# Patient Record
Sex: FEMALE | Race: WHITE | Employment: OTHER | URBAN - METROPOLITAN AREA
[De-identification: names, ages, dates, MRNs, and addresses within clinical notes are randomized per-mention and may not be internally consistent; named-entity substitution may affect disease eponyms.]

---

## 2017-02-11 DIAGNOSIS — Z11.4 SCREENING FOR HIV (HUMAN IMMUNODEFICIENCY VIRUS): ICD-10-CM

## 2017-02-11 DIAGNOSIS — E03.9 ACQUIRED HYPOTHYROIDISM: ICD-10-CM

## 2017-02-11 LAB — TSH REFLEX: 2.95 UIU/ML (ref 0.27–4.2)

## 2017-02-13 LAB — HIV-1 AND HIV-2 ANTIBODIES: NORMAL

## 2017-02-21 ENCOUNTER — OFFICE VISIT (OUTPATIENT)
Dept: FAMILY MEDICINE CLINIC | Age: 47
End: 2017-02-21

## 2017-02-21 VITALS
TEMPERATURE: 98 F | DIASTOLIC BLOOD PRESSURE: 70 MMHG | BODY MASS INDEX: 26.78 KG/M2 | SYSTOLIC BLOOD PRESSURE: 100 MMHG | WEIGHT: 156 LBS

## 2017-02-21 DIAGNOSIS — Z13.220 SCREENING CHOLESTEROL LEVEL: ICD-10-CM

## 2017-02-21 DIAGNOSIS — Z13.1 SCREENING FOR DIABETES MELLITUS: ICD-10-CM

## 2017-02-21 DIAGNOSIS — E03.9 ACQUIRED HYPOTHYROIDISM: Primary | ICD-10-CM

## 2017-02-21 PROBLEM — Z97.0 PRESENCE OF ARTIFICIAL LEFT EYE: Status: ACTIVE | Noted: 2017-02-21

## 2017-02-21 PROCEDURE — 99213 OFFICE O/P EST LOW 20 MIN: CPT | Performed by: FAMILY MEDICINE

## 2017-02-21 RX ORDER — LEVOTHYROXINE SODIUM 0.07 MG/1
75 TABLET ORAL DAILY
Qty: 30 TABLET | Refills: 12 | Status: SHIPPED | OUTPATIENT
Start: 2017-02-21 | End: 2017-10-20 | Stop reason: ALTCHOICE

## 2017-02-27 ASSESSMENT — ENCOUNTER SYMPTOMS
SHORTNESS OF BREATH: 0
ABDOMINAL PAIN: 0

## 2017-05-10 ENCOUNTER — HOSPITAL ENCOUNTER (OUTPATIENT)
Dept: CT IMAGING | Age: 47
Discharge: HOME | End: 2017-05-10
Attending: FAMILY MEDICINE | Admitting: FAMILY MEDICINE

## 2017-05-10 DIAGNOSIS — Z93.1 GASTROSTOMY TUBE IN PLACE (HCC): ICD-10-CM

## 2017-05-10 DIAGNOSIS — K59.00 CONSTIPATION, UNSPECIFIED CONSTIPATION TYPE: ICD-10-CM

## 2017-05-31 ENCOUNTER — TELEPHONE (OUTPATIENT)
Dept: FAMILY MEDICINE CLINIC | Age: 47
End: 2017-05-31

## 2017-06-02 ENCOUNTER — OFFICE VISIT (OUTPATIENT)
Dept: FAMILY MEDICINE CLINIC | Age: 47
End: 2017-06-02

## 2017-06-02 VITALS — SYSTOLIC BLOOD PRESSURE: 108 MMHG | HEIGHT: 64 IN | DIASTOLIC BLOOD PRESSURE: 66 MMHG

## 2017-06-02 DIAGNOSIS — Z93.3 S/P COLOSTOMY (HCC): ICD-10-CM

## 2017-06-02 DIAGNOSIS — G81.91 RIGHT HEMIPARESIS (HCC): ICD-10-CM

## 2017-06-02 DIAGNOSIS — Z97.0 EYE GLOBE PROSTHESIS: Primary | ICD-10-CM

## 2017-06-02 DIAGNOSIS — T07.XXXA MULTIPLE TRAUMA: ICD-10-CM

## 2017-06-02 DIAGNOSIS — E03.9 ACQUIRED HYPOTHYROIDISM: ICD-10-CM

## 2017-06-02 PROCEDURE — 99214 OFFICE O/P EST MOD 30 MIN: CPT | Performed by: INTERNAL MEDICINE

## 2017-06-02 RX ORDER — TRAMADOL HYDROCHLORIDE 50 MG/1
50 TABLET ORAL 2 TIMES DAILY
COMMUNITY
End: 2017-10-20 | Stop reason: ALTCHOICE

## 2017-06-02 RX ORDER — SENNA AND DOCUSATE SODIUM 50; 8.6 MG/1; MG/1
1 TABLET, FILM COATED ORAL DAILY
COMMUNITY

## 2017-06-02 RX ORDER — MOXIFLOXACIN 5 MG/ML
1 SOLUTION/ DROPS OPHTHALMIC 3 TIMES DAILY
COMMUNITY
End: 2017-10-20 | Stop reason: ALTCHOICE

## 2017-06-02 RX ORDER — AMANTADINE HYDROCHLORIDE 100 MG/1
200 TABLET ORAL DAILY
COMMUNITY
End: 2017-07-28 | Stop reason: ALTCHOICE

## 2017-06-02 RX ORDER — MODAFINIL 100 MG/1
100 TABLET ORAL DAILY
COMMUNITY
End: 2017-07-28 | Stop reason: ALTCHOICE

## 2017-06-02 ASSESSMENT — ENCOUNTER SYMPTOMS
DIARRHEA: 0
WHEEZING: 0
APNEA: 0
BLOOD IN STOOL: 0
CONSTIPATION: 0
NAUSEA: 0
COUGH: 0
ABDOMINAL PAIN: 0
SORE THROAT: 0
SHORTNESS OF BREATH: 0

## 2017-06-16 ENCOUNTER — INITIAL CONSULT (OUTPATIENT)
Dept: SURGERY | Age: 47
End: 2017-06-16

## 2017-06-16 VITALS — DIASTOLIC BLOOD PRESSURE: 70 MMHG | SYSTOLIC BLOOD PRESSURE: 120 MMHG

## 2017-06-16 DIAGNOSIS — Z93.3 COLOSTOMY IN PLACE (HCC): ICD-10-CM

## 2017-06-16 DIAGNOSIS — G81.91 RIGHT HEMIPARESIS (HCC): ICD-10-CM

## 2017-06-16 DIAGNOSIS — T07.XXXA MULTIPLE TRAUMA: Primary | ICD-10-CM

## 2017-06-16 PROCEDURE — 99204 OFFICE O/P NEW MOD 45 MIN: CPT | Performed by: SURGERY

## 2017-06-23 ENCOUNTER — TELEPHONE (OUTPATIENT)
Dept: SURGERY | Age: 47
End: 2017-06-23

## 2017-07-14 ENCOUNTER — TELEPHONE (OUTPATIENT)
Dept: SURGERY | Age: 47
End: 2017-07-14

## 2017-07-28 ENCOUNTER — OFFICE VISIT (OUTPATIENT)
Dept: FAMILY MEDICINE CLINIC | Age: 47
End: 2017-07-28

## 2017-07-28 VITALS
WEIGHT: 138 LBS | DIASTOLIC BLOOD PRESSURE: 78 MMHG | BODY MASS INDEX: 23.56 KG/M2 | SYSTOLIC BLOOD PRESSURE: 126 MMHG | HEIGHT: 64 IN | TEMPERATURE: 98.2 F

## 2017-07-28 DIAGNOSIS — G89.29 CHRONIC RIGHT SHOULDER PAIN: Primary | ICD-10-CM

## 2017-07-28 DIAGNOSIS — Z97.0 PRESENCE OF ARTIFICIAL LEFT EYE: ICD-10-CM

## 2017-07-28 DIAGNOSIS — T07.XXXA MULTIPLE TRAUMA: ICD-10-CM

## 2017-07-28 DIAGNOSIS — M25.511 CHRONIC RIGHT SHOULDER PAIN: Primary | ICD-10-CM

## 2017-07-28 DIAGNOSIS — E78.5 DYSLIPIDEMIA: ICD-10-CM

## 2017-07-28 DIAGNOSIS — E03.9 ACQUIRED HYPOTHYROIDISM: ICD-10-CM

## 2017-07-28 PROCEDURE — 99242 OFF/OP CONSLTJ NEW/EST SF 20: CPT | Performed by: INTERNAL MEDICINE

## 2017-07-28 ASSESSMENT — ENCOUNTER SYMPTOMS
BLOOD IN STOOL: 0
DIARRHEA: 0
NAUSEA: 0
VOMITING: 0
SINUS PRESSURE: 0
WHEEZING: 0
COUGH: 0
ABDOMINAL PAIN: 0
SHORTNESS OF BREATH: 0
SORE THROAT: 0
APNEA: 0
CONSTIPATION: 0
RHINORRHEA: 0

## 2017-08-17 PROBLEM — R56.9 SEIZURE (HCC): Status: ACTIVE | Noted: 2017-03-07

## 2017-08-17 PROBLEM — S06.9X0S LATE EFFECT OF BRAIN INJURY (HCC): Status: ACTIVE | Noted: 2017-03-17

## 2017-08-17 PROBLEM — S32.009A FRACTURE OF LUMBAR VERTEBRA (HCC): Status: ACTIVE | Noted: 2017-03-13

## 2017-08-17 PROBLEM — S92.009A CLOSED FRACTURE OF CALCANEUS: Status: ACTIVE | Noted: 2017-03-21

## 2017-08-17 PROBLEM — S06.6X9A SUBARACHNOID HEMORRHAGE FOLLOWING INJURY WITHOUT OPEN INTRACRANIAL WOUND AND WITH LOSS OF CONSCIOUSNESS (HCC): Status: ACTIVE | Noted: 2017-03-13

## 2017-08-17 PROBLEM — S06.9XAS LATE EFFECT OF BRAIN INJURY: Status: ACTIVE | Noted: 2017-03-17

## 2017-08-17 PROBLEM — S36.039A LACERATION OF SPLEEN: Status: ACTIVE | Noted: 2017-03-13

## 2017-08-17 PROBLEM — S27.809A DIAPHRAGM INJURY: Status: ACTIVE | Noted: 2017-03-13

## 2017-08-18 ENCOUNTER — OFFICE VISIT (OUTPATIENT)
Dept: SURGERY | Age: 47
End: 2017-08-18

## 2017-08-18 VITALS
SYSTOLIC BLOOD PRESSURE: 111 MMHG | WEIGHT: 137 LBS | DIASTOLIC BLOOD PRESSURE: 73 MMHG | HEART RATE: 102 BPM | BODY MASS INDEX: 23.52 KG/M2

## 2017-08-18 DIAGNOSIS — G81.91 RIGHT HEMIPARESIS (HCC): ICD-10-CM

## 2017-08-18 DIAGNOSIS — Z93.3 COLOSTOMY IN PLACE (HCC): Primary | ICD-10-CM

## 2017-08-18 DIAGNOSIS — T07.XXXA MULTIPLE TRAUMA: ICD-10-CM

## 2017-08-18 PROCEDURE — 99213 OFFICE O/P EST LOW 20 MIN: CPT | Performed by: SURGERY

## 2017-08-18 RX ORDER — POLYETHYLENE GLYCOL 3350 17 G/17G
17 POWDER, FOR SOLUTION ORAL DAILY
Qty: 255 G | Refills: 0 | Status: SHIPPED | OUTPATIENT
Start: 2017-08-18 | End: 2017-09-17

## 2017-08-18 ASSESSMENT — ENCOUNTER SYMPTOMS
GASTROINTESTINAL NEGATIVE: 1
EYE ITCHING: 0
BACK PAIN: 0
EYE REDNESS: 0
ALLERGIC/IMMUNOLOGIC NEGATIVE: 1
PHOTOPHOBIA: 0
RESPIRATORY NEGATIVE: 1
EYE PAIN: 0
EYE DISCHARGE: 0

## 2017-08-23 ENCOUNTER — TELEPHONE (OUTPATIENT)
Dept: SURGERY | Age: 47
End: 2017-08-23

## 2017-08-23 NOTE — TELEPHONE ENCOUNTER
Returned the call to the patient' s  bill who states the patient is scheduled to have the Barium Enema a performed on 8/28/17, he has been advised patient should not  perform the bowel prep the day before the procedure, the bowel prep instructions were provided for sx only should the patient proceed to colonoscopy reversal,  He understood the information  has agreed to this.

## 2017-08-23 NOTE — TELEPHONE ENCOUNTER
Pt is having barium enema and they want to know if it is going to be done thru colostomy bag or rectum please call Pamella at [facility] back

## 2017-08-28 ENCOUNTER — HOSPITAL ENCOUNTER (OUTPATIENT)
Dept: GENERAL RADIOLOGY | Age: 47
Discharge: HOME | End: 2017-08-28
Attending: SURGERY | Admitting: SURGERY

## 2017-08-28 DIAGNOSIS — Z93.3 COLOSTOMY STATUS (HCC): ICD-10-CM

## 2017-08-28 DIAGNOSIS — Z93.3 COLOSTOMY IN PLACE (HCC): ICD-10-CM

## 2017-09-22 ENCOUNTER — TELEPHONE (OUTPATIENT)
Dept: SURGERY | Age: 47
End: 2017-09-22

## 2017-10-20 ENCOUNTER — OFFICE VISIT (OUTPATIENT)
Dept: SURGERY | Age: 47
End: 2017-10-20

## 2017-10-20 ENCOUNTER — TELEPHONE (OUTPATIENT)
Dept: SURGERY | Age: 47
End: 2017-10-20

## 2017-10-20 VITALS
DIASTOLIC BLOOD PRESSURE: 74 MMHG | OXYGEN SATURATION: 98 % | HEIGHT: 64 IN | SYSTOLIC BLOOD PRESSURE: 126 MMHG | BODY MASS INDEX: 22.77 KG/M2 | TEMPERATURE: 97.6 F | RESPIRATION RATE: 16 BRPM | WEIGHT: 133.4 LBS | HEART RATE: 91 BPM

## 2017-10-20 DIAGNOSIS — S06.6X9D: ICD-10-CM

## 2017-10-20 DIAGNOSIS — Z93.3 COLOSTOMY IN PLACE (HCC): ICD-10-CM

## 2017-10-20 DIAGNOSIS — T07.XXXA MULTIPLE TRAUMA: Primary | ICD-10-CM

## 2017-10-20 PROCEDURE — 99213 OFFICE O/P EST LOW 20 MIN: CPT | Performed by: SURGERY

## 2017-10-20 RX ORDER — POLYETHYLENE GLYCOL 3350 17 G/17G
17 POWDER, FOR SOLUTION ORAL DAILY
Qty: 255 G | Refills: 0 | Status: SHIPPED | OUTPATIENT
Start: 2017-10-20 | End: 2017-11-19

## 2017-10-20 ASSESSMENT — ENCOUNTER SYMPTOMS
ALLERGIC/IMMUNOLOGIC NEGATIVE: 1
EYE REDNESS: 0
COLOR CHANGE: 0
EYE ITCHING: 0
BACK PAIN: 0
RESPIRATORY NEGATIVE: 1
EYE PAIN: 0
PHOTOPHOBIA: 0
EYE DISCHARGE: 0

## 2017-10-20 NOTE — TELEPHONE ENCOUNTER
Returned the call tot he patient's  advising the  the patient is currently on the OR schedule for 11/8/2017, the patient's   was ok with the information today.

## 2017-10-20 NOTE — LETTER
CONSENT TO OPERATION  AND/OR OTHER PROCEDURE(S)          PATIENT : Thony Dan OF BIRTH:  1970      DATE : 10/20/17          1. I request and consent that Dr. Miquel Marcano,  and/or his associates or assistants perform an operation and/or procedures on the above patient at  [facility], to treat the condition(s) which appear indicated by the diagnostic studies already performed. I have been told that in general terms the nature, purpose and reasonable expectations of the operation and/or procedure(s) are:     Colostomy Reversal      2. It has been explained to me by the informing physician that during the course of the operation and/or procedure(s) unforeseen conditions may be revealed that necessitate an extension of the original operation and/or procedure(s) or different operation and/or procedures than those set forth in Paragraph 1. I therefore authorize and request that my physician and/or his associates or assistants perform such operations and/or procedures as are necessary and desirable in the exercise of professional judgment. The authority granted under this Paragraph 2 shall extend to all conditions that require treatment and are known to my physician at the time the operation is commenced. 3. I have been made aware by the informing physician of certain risks and consequences that are associated with the operation and/or procedure(s) described in Paragraph 1, the reasonable alternative methods or treatment, the possible consequences, the possibility that the operation and/or procedure(s) may be unsuccessful and the possibility of complications. I understand the reasonably known risks to be:      ? Bleeding  ? Infection  ? Poor Healing  ? Possible Damage to Nerve, Vessel, Tendon/Muscle or Bone  ? Need for further Treatment/Surgery  ? Stiffness  ? Pain  ? Residual or Recurrent Symptoms  ?  Anesthetic and/or Medical Risks

## 2017-10-30 ENCOUNTER — OFFICE VISIT (OUTPATIENT)
Dept: FAMILY MEDICINE CLINIC | Age: 47
End: 2017-10-30

## 2017-10-30 VITALS
SYSTOLIC BLOOD PRESSURE: 110 MMHG | DIASTOLIC BLOOD PRESSURE: 60 MMHG | HEIGHT: 64 IN | HEART RATE: 96 BPM | TEMPERATURE: 98.1 F | BODY MASS INDEX: 24.55 KG/M2 | WEIGHT: 143.8 LBS

## 2017-10-30 DIAGNOSIS — G81.91 RIGHT HEMIPARESIS (HCC): ICD-10-CM

## 2017-10-30 DIAGNOSIS — E03.9 ACQUIRED HYPOTHYROIDISM: ICD-10-CM

## 2017-10-30 DIAGNOSIS — Z23 NEED FOR INFLUENZA VACCINATION: ICD-10-CM

## 2017-10-30 DIAGNOSIS — Z93.3 COLOSTOMY IN PLACE (HCC): ICD-10-CM

## 2017-10-30 DIAGNOSIS — Z01.818 PRE-OP EXAM: Primary | ICD-10-CM

## 2017-10-30 DIAGNOSIS — Z97.0 PRESENCE OF ARTIFICIAL LEFT EYE: ICD-10-CM

## 2017-10-30 DIAGNOSIS — S06.9X0S LATE EFFECT OF BRAIN INJURY (HCC): ICD-10-CM

## 2017-10-30 DIAGNOSIS — T07.XXXA MULTIPLE TRAUMA: ICD-10-CM

## 2017-10-30 PROCEDURE — 90686 IIV4 VACC NO PRSV 0.5 ML IM: CPT | Performed by: FAMILY MEDICINE

## 2017-10-30 PROCEDURE — 90471 IMMUNIZATION ADMIN: CPT | Performed by: FAMILY MEDICINE

## 2017-10-30 PROCEDURE — 99244 OFF/OP CNSLTJ NEW/EST MOD 40: CPT | Performed by: FAMILY MEDICINE

## 2017-10-30 RX ORDER — LEVOTHYROXINE SODIUM 0.07 MG/1
75 TABLET ORAL DAILY
Qty: 30 TABLET | Refills: 12 | Status: SHIPPED | OUTPATIENT
Start: 2017-10-30 | End: 2017-12-19 | Stop reason: SDUPTHER

## 2017-10-30 ASSESSMENT — ENCOUNTER SYMPTOMS
DIARRHEA: 0
SORE THROAT: 0
ABDOMINAL PAIN: 0
SHORTNESS OF BREATH: 0
EYE ITCHING: 0
EYE DISCHARGE: 0
COUGH: 0
VOMITING: 0
TROUBLE SWALLOWING: 0
BLOOD IN STOOL: 0
EYE REDNESS: 0

## 2017-10-30 NOTE — PROGRESS NOTES
Wt Readings from Last 3 Encounters:   10/30/17 143 lb 12.8 oz (65.2 kg)   10/20/17 133 lb 6.4 oz (60.5 kg)   08/18/17 137 lb (62.1 kg)     Body mass index is 24.68 kg/m². Immunization History   Administered Date(s) Administered    Tdap (Boostrix, Adacel) 04/02/2009             ASSESSMENT  1. Pre-op exam     2. Colostomy in place [facility])     3. Multiple trauma     4. Late effect of brain injury ([facility] [address]     5. Right hemiparesis ([facility] [address]     6. Acquired hypothyroidism     7. Presence of artificial left eye         PLAN      Khadra Avalos is at a low risk for planned colostomy reversal.  She may proceed with abdominal surgery as planned. Flu vacc today      New meds this visit:    Orders Placed This Encounter   Medications    levothyroxine (LEVOTHROID) 75 MCG tablet     Sig: Take 1 tablet by mouth daily     Dispense:  30 tablet     Refill:  12       New orders placed this visit:  No orders of the defined types were placed in this encounter. continue with the following meds:    Outpatient Encounter Prescriptions as of 10/30/2017   Medication Sig Dispense Refill    levothyroxine (LEVOTHROID) 75 MCG tablet Take 1 tablet by mouth daily 30 tablet 12    sennosides-docusate sodium (SENOKOT-S) 8.6-50 MG tablet Take 1 tablet by mouth daily      polyethylene glycol (MIRALAX) powder Take 17 g by mouth daily 255 g 0     No facility-administered encounter medications on file as of 10/30/2017.           Arlette Whalen D.O.

## 2017-10-30 NOTE — PATIENT INSTRUCTIONS
Please call your pharmacy if you need any refills of your medication(s). Please call our office at [phone] if you don't hear from us about your test results. Please be sure to call our office if your illness/problem has been treated for but has not completely resolved. Bring an accurate list of your medications with you at every appointment to ensure that we have the correct information. Our office hours are: Monday - Friday 7 am- 5 pm; Saturdays vary on doctor working.     Phone lines turn on at 8 am.

## 2017-11-03 ENCOUNTER — PAT TELEPHONE (OUTPATIENT)
Dept: PREADMISSION TESTING | Age: 47
End: 2017-11-03

## 2017-11-03 NOTE — PRE-PROCEDURE INSTRUCTIONS
of surgery. All jewelry must be removed. If you have dentures, they will be removed before going to operating room. For your convenience, we will provide you with a container. If you wear contact lenses or glasses, they will be removed, please bring a case for them. If you have a living will and a durable power of  for healthcare, please bring in a copy. As part of our patient safety program to minimize surgical site infections, we ask you to do the following:    · Please notify your surgeon if you develop any illness between         now and the  day of your surgery. · This includes a cough, cold, fever, sore throat, nausea,         or vomiting, and diarrhea, etc.  ·  Please notify your surgeon if you experience dizziness, shortness         of breath or blurred vision between now and the time of your surgery. Do not shave your operative site 96 hours prior to surgery. For face and neck surgery, men may use an electric razor 48 hours   prior to surgery. You may shower the night before surgery or the morning of   your surgery with an antibacterial soap. You will need to bring a photo ID and insurance card    [facility] has an onsite pharmacy, would you like to utilize our pharmacy     If you will be staying overnight and use a C-pap machine, please bring   your C-pap to hospital     Our goal is to provide you with excellent care, therefore, visitors will be limited to two(2) in the room at a time so that we may focus on providing this care for you. Please contact pre-admission testing if you have any further questions. [facility] phone number:  [phone] [facility] fax number:  [phone]  Please note these are generalized instructions for all surgical cases, you may be provided with more specific instructions according to your surgery.

## 2017-11-06 ENCOUNTER — SURG/PROC ORDERS (OUTPATIENT)
Dept: ANESTHESIOLOGY | Age: 47
End: 2017-11-06

## 2017-11-06 RX ORDER — SODIUM CHLORIDE 0.9 % (FLUSH) 0.9 %
10 SYRINGE (ML) INJECTION PRN
Status: CANCELLED | OUTPATIENT
Start: 2017-11-06

## 2017-11-06 RX ORDER — SODIUM CHLORIDE 0.9 % (FLUSH) 0.9 %
10 SYRINGE (ML) INJECTION EVERY 12 HOURS SCHEDULED
Status: CANCELLED | OUTPATIENT
Start: 2017-11-06

## 2017-11-06 RX ORDER — SODIUM CHLORIDE 9 MG/ML
INJECTION, SOLUTION INTRAVENOUS CONTINUOUS
Status: CANCELLED | OUTPATIENT
Start: 2017-11-06

## 2017-11-10 PROBLEM — Z98.890 STATUS POST COLOSTOMY TAKEDOWN: Status: ACTIVE | Noted: 2017-11-10

## 2017-11-14 ENCOUNTER — TELEPHONE (OUTPATIENT)
Dept: SURGERY | Age: 47
End: 2017-11-14

## 2017-11-14 ENCOUNTER — CARE COORDINATION (OUTPATIENT)
Dept: CASE MANAGEMENT | Age: 47
End: 2017-11-14

## 2017-11-14 NOTE — TELEPHONE ENCOUNTER
Returned the call to the patients  Magda Meehan, was able to offer an apt for 11/28 at 4:00 pm, Bill agreed to this, the call ended.

## 2017-11-15 ENCOUNTER — CARE COORDINATION (OUTPATIENT)
Dept: CARE COORDINATION | Age: 47
End: 2017-11-15

## 2017-11-16 ENCOUNTER — CARE COORDINATION (OUTPATIENT)
Dept: MEDSURG UNIT | Age: 47
End: 2017-11-16

## 2017-11-16 NOTE — CARE COORDINATION
[facility] Transitions Follow Up Call    2017    Patient: Kendal Alpers  Patient : 1970   MRN: <[id number]>  Reason for Admission: There are no discharge diagnoses documented for the most recent discharge. Discharge Date: 17 RARS: Risk Score: 18.5       Spoke with:  Bill    Non-face-to-face services provided:  Obtained and reviewed discharge summary and/or continuity of care documents      Care Transitions Subsequent and Final Call    Subsequent and Final Calls  Care Transitions Interventions  Other Interventions:        Spoke with Pt's  bill for f/u transitions call. Pt's pain is @3-4/10. Taking Tylenol prn for pain. Denies any s/s of infection, fever. Reviewed upcoming appt. No questions or concerns at this time. Agreed to f/u transitions calls.     Indio Avila RN BSN   Care Transitions Coordinator  [phone]       Follow Up  Future Appointments  Date Time Provider Alexander Jose   2017 4:00 PM Don Andrade MD [facility]   2017 10:30 AM DO Armand Estevez [facility]       Indio Avila RN

## 2017-11-22 ENCOUNTER — CARE COORDINATION (OUTPATIENT)
Dept: MEDSURG UNIT | Age: 47
End: 2017-11-22

## 2017-11-22 NOTE — CARE COORDINATION
[facility] Transitions Follow Up Call    2017    Patient: Isidro Organ  Patient : 1970   MRN: <[id number]>  Reason for Admission: There are no discharge diagnoses documented for the most recent discharge. Discharge Date: 17 RARS: Risk Score: 18.5       Spoke with:  Bill    Non-face-to-face services provided:  Obtained and reviewed discharge summary and/or continuity of care documents      Care Transitions Subsequent and Final Call    Subsequent and Final Calls  Do you have any ongoing symptoms?:  No  Have your medications changed?:  No  Do you have any questions related to your medications?:  No  Do you currently have any active services?:  No  Do you have any needs or concerns that I can assist you with?:  No  Identified Barriers:  None  Care Transitions Interventions  Other Interventions:          Spoke with Pt's  [facility] for f/u transitions. Stated Pt is doing fine so far. Denies pain, fever. Pt tolerating diet well. Reviewed upcoming appts. For post Op and PCP.  stated PCP knew Pt was having procedure and did not need to see Pt sooner than  appt previously scheduled. No questions or concerns at this time. Will continue to follow for transitions.     Saloni Ribeiro, MICHELE BSN   Care Transitions Coordinator  [phone]     Follow Up  Future Appointments  Date Time Provider Alexander Jose   2017 4:00 PM Karan Milner MD [facility]   2017 10:30 AM DO Armand Catalan [facility]       Saloni Ribeiro RN

## 2017-11-27 ENCOUNTER — CARE COORDINATION (OUTPATIENT)
Dept: CASE MANAGEMENT | Age: 47
End: 2017-11-27

## 2017-11-28 ENCOUNTER — OFFICE VISIT (OUTPATIENT)
Dept: SURGERY | Age: 47
End: 2017-11-28

## 2017-11-28 VITALS
WEIGHT: 145 LBS | DIASTOLIC BLOOD PRESSURE: 75 MMHG | BODY MASS INDEX: 24.75 KG/M2 | HEART RATE: 102 BPM | HEIGHT: 64 IN | SYSTOLIC BLOOD PRESSURE: 116 MMHG

## 2017-11-28 DIAGNOSIS — S06.6X9D: ICD-10-CM

## 2017-11-28 DIAGNOSIS — S27.809D INJURY OF DIAPHRAGM, SUBSEQUENT ENCOUNTER: ICD-10-CM

## 2017-11-28 DIAGNOSIS — S32.009A CLOSED FRACTURE OF LUMBAR VERTEBRA, UNSPECIFIED FRACTURE MORPHOLOGY, UNSPECIFIED LUMBAR VERTEBRAL LEVEL, INITIAL ENCOUNTER (HCC): ICD-10-CM

## 2017-11-28 DIAGNOSIS — Z98.890 STATUS POST COLOSTOMY TAKEDOWN: Primary | ICD-10-CM

## 2017-11-28 PROCEDURE — 99024 POSTOP FOLLOW-UP VISIT: CPT | Performed by: SURGERY

## 2017-11-28 ASSESSMENT — ENCOUNTER SYMPTOMS
ALLERGIC/IMMUNOLOGIC NEGATIVE: 1
RESPIRATORY NEGATIVE: 1
EYE PAIN: 0
EYE REDNESS: 0
EYE DISCHARGE: 0
PHOTOPHOBIA: 0
COLOR CHANGE: 0
BACK PAIN: 0
EYE ITCHING: 0

## 2017-11-28 NOTE — PROGRESS NOTES
sodium (SENOKOT-S) 8.6-50 MG tablet, Take 1 tablet by mouth daily, Disp: , Rfl:     Cephalexin and Pcn [penicillins]    Vitals:    11/28/17 1549   BP: 116/75   Site: Left Arm   Position: Sitting   Cuff Size: Small Adult   Pulse: 102   Weight: 145 lb (65.8 kg)   Height: 5' 4\" (1.626 m)       Admission on 11/08/2017, Discharged on 11/13/2017   Component Date Value Ref Range Status    Sodium 11/08/2017 137  136 - 145 mmol/L Final    Potassium 11/08/2017 5.7* 3.5 - 5.1 mmol/L Final    Comment: Specimen hemolysis has exceeded the interference as defined by Roche. Value may be falsely increased. Suggest recollection if clinically  indicated.  Chloride 11/08/2017 101  99 - 110 mmol/L Final    CO2 11/08/2017 24  21 - 32 mmol/L Final    Anion Gap 11/08/2017 12  3 - 16 Final    Glucose 11/08/2017 106* 70 - 99 mg/dL Final    BUN 11/08/2017 10  7 - 20 mg/dL Final    CREATININE 11/08/2017 <0.5* 0.6 - 1.1 mg/dL Final    GFR Non- 11/08/2017 >60  >60 Final    Comment: >60 mL/min/1.73m2 EGFR, calc. for ages 25 and older using the  MDRD formula (not corrected for weight), is valid for stable  renal function.  GFR  11/08/2017 >60  >60 Final    Comment: Chronic Kidney Disease: less than 60 ml/min/1.73 sq.m. Kidney Failure: less than 15 ml/min/1.73 sq.m. Results valid for patients 18 years and older.       Calcium 11/08/2017 9.1  8.3 - 10.6 mg/dL Final    WBC 11/08/2017 10.8  4.0 - 11.0 K/uL Final    RBC 11/08/2017 4.21  4.00 - 5.20 M/uL Final    Hemoglobin 11/08/2017 12.9  12.0 - 16.0 g/dL Final    Hematocrit 11/08/2017 39.4  36.0 - 48.0 % Final    MCV 11/08/2017 93.7  80.0 - 100.0 fL Final    MCH 11/08/2017 30.7  26.0 - 34.0 pg Final    MCHC 11/08/2017 32.8  31.0 - 36.0 g/dL Final    RDW 11/08/2017 13.7  12.4 - 15.4 % Final    Platelets 41/34/8598 315  135 - 450 K/uL Final    MPV 11/08/2017 10.6* 5.0 - 10.5 fL Final    Ventricular Rate 11/14/2017 90  BPM Final    Atrial Rate 11/14/2017 90  BPM Final    P-R Interval 11/14/2017 144  ms Final    QRS Duration 11/14/2017 60  ms Final    Q-T Interval 11/14/2017 374  ms Final    QTc Calculation (Bazett) 11/14/2017 457  ms Final    P Axis 11/14/2017 52  degrees Final    R Axis 11/14/2017 6  degrees Final    T Axis 11/14/2017 28  degrees Final    Diagnosis 11/14/2017    Final                    Value:Normal sinus rhythm  Normal ECG  No previous ECGs available  Confirmed by CARA [facility]ACE [facility] MD, Dillon Yañez ([id number]) on 11/8/2017 10:39:05 AM      ABO/Rh 11/08/2017 CANCELED   Final    Comment: @11/08/17 09:08 by Kyle Boothe:  grossly hemolyzed move / moved to tube    Result canceled by the ancillary      Antibody Screen 11/08/2017 NEG   Final    hCG Qual 11/08/2017 Negative  Detects HCG level >10 MIU/mL Final    ABO/Rh 11/08/2017 O POS   Final    Comment: @11/08/17 09:12 by QZWBG:  grossly hemolyzed move / moved to tube      Gram Stain Result 11/13/2017    Final                    Value:No WBC's seen  2+ Gram positive cocci      Culture Surgical 11/13/2017 No growth 60-72 hours   Final    Anaerobic Culture 11/13/2017 No anaerobes isolated   Final    Sodium 11/09/2017 139  136 - 145 mmol/L Final    Potassium 11/09/2017 3.7  3.5 - 5.1 mmol/L Final    Chloride 11/09/2017 105  99 - 110 mmol/L Final    CO2 11/09/2017 23  21 - 32 mmol/L Final    Anion Gap 11/09/2017 11  3 - 16 Final    Glucose 11/09/2017 108* 70 - 99 mg/dL Final    BUN 11/09/2017 7  7 - 20 mg/dL Final    CREATININE 11/09/2017 <0.5* 0.6 - 1.1 mg/dL Final    GFR Non- 11/09/2017 >60  >60 Final    Comment: >60 mL/min/1.73m2 EGFR, calc. for ages 25 and older using the  MDRD formula (not corrected for weight), is valid for stable  renal function.  GFR  11/09/2017 >60  >60 Final    Comment: Chronic Kidney Disease: less than 60 ml/min/1.73 sq.m. Kidney Failure: less than 15 ml/min/1.73 sq.m. Results valid for patients 18 years and older.  Calcium 11/09/2017 7.8* 8.3 - 10.6 mg/dL Final    WBC 11/09/2017 14.5* 4.0 - 11.0 K/uL Final    RBC 11/09/2017 3.21* 4.00 - 5.20 M/uL Final    Hemoglobin 11/09/2017 10.0* 12.0 - 16.0 g/dL Final    Hematocrit 11/09/2017 29.9* 36.0 - 48.0 % Final    MCV 11/09/2017 93.0  80.0 - 100.0 fL Final    MCH 11/09/2017 31.1  26.0 - 34.0 pg Final    MCHC 11/09/2017 33.4  31.0 - 36.0 g/dL Final    RDW 11/09/2017 13.3  12.4 - 15.4 % Final    Platelets 69/42/1134 231  135 - 450 K/uL Final    MPV 11/09/2017 10.0  5.0 - 10.5 fL Final    WBC 11/10/2017 10.7  4.0 - 11.0 K/uL Final    RBC 11/10/2017 3.21* 4.00 - 5.20 M/uL Final    Hemoglobin 11/10/2017 9.9* 12.0 - 16.0 g/dL Final    Hematocrit 11/10/2017 30.3* 36.0 - 48.0 % Final    MCV 11/10/2017 94.4  80.0 - 100.0 fL Final    MCH 11/10/2017 30.9  26.0 - 34.0 pg Final    MCHC 11/10/2017 32.7  31.0 - 36.0 g/dL Final    RDW 11/10/2017 13.3  12.4 - 15.4 % Final    Platelets 22/07/7222 245  135 - 450 K/uL Final    MPV 11/10/2017 9.4  5.0 - 10.5 fL Final    Sodium 11/10/2017 137  136 - 145 mmol/L Final    Potassium 11/10/2017 3.4* 3.5 - 5.1 mmol/L Final    Chloride 11/10/2017 101  99 - 110 mmol/L Final    CO2 11/10/2017 22  21 - 32 mmol/L Final    Anion Gap 11/10/2017 14  3 - 16 Final    Glucose 11/10/2017 100* 70 - 99 mg/dL Final    BUN 11/10/2017 5* 7 - 20 mg/dL Final    CREATININE 11/10/2017 <0.5* 0.6 - 1.1 mg/dL Final    GFR Non- 11/10/2017 >60  >60 Final    Comment: >60 mL/min/1.73m2 EGFR, calc. for ages 25 and older using the  MDRD formula (not corrected for weight), is valid for stable  renal function.  GFR  11/10/2017 >60  >60 Final    Comment: Chronic Kidney Disease: less than 60 ml/min/1.73 sq.m. Kidney Failure: less than 15 ml/min/1.73 sq.m. Results valid for patients 18 years and older.       Calcium 11/10/2017 7.9* 8.3 - 10.6 mg/dL Final    WBC 11/13/2017 7.5  4.0 - 11.0 K/uL Final    RBC 11/13/2017 3.31* 4.00 - 5.20 M/uL Final    Hemoglobin 11/13/2017 10.2* 12.0 - 16.0 g/dL Final    Hematocrit 11/13/2017 30.6* 36.0 - 48.0 % Final    MCV 11/13/2017 92.6  80.0 - 100.0 fL Final    MCH 11/13/2017 30.8  26.0 - 34.0 pg Final    MCHC 11/13/2017 33.3  31.0 - 36.0 g/dL Final    RDW 11/13/2017 13.3  12.4 - 15.4 % Final    Platelets 79/60/2741 345  135 - 450 K/uL Final    MPV 11/13/2017 8.5  5.0 - 10.5 fL Final    Sodium 11/13/2017 140  136 - 145 mmol/L Final    Potassium 11/13/2017 3.6  3.5 - 5.1 mmol/L Final    Chloride 11/13/2017 104  99 - 110 mmol/L Final    CO2 11/13/2017 23  21 - 32 mmol/L Final    Anion Gap 11/13/2017 13  3 - 16 Final    Glucose 11/13/2017 112* 70 - 99 mg/dL Final    BUN 11/13/2017 5* 7 - 20 mg/dL Final    CREATININE 11/13/2017 <0.5* 0.6 - 1.1 mg/dL Final    GFR Non- 11/13/2017 >60  >60 Final    Comment: >60 mL/min/1.73m2 EGFR, calc. for ages 25 and older using the  MDRD formula (not corrected for weight), is valid for stable  renal function.  GFR  11/13/2017 >60  >60 Final    Comment: Chronic Kidney Disease: less than 60 ml/min/1.73 sq.m. Kidney Failure: less than 15 ml/min/1.73 sq.m. Results valid for patients 18 years and older.  Calcium 11/13/2017 8.5  8.3 - 10.6 mg/dL Final       No results found. Review of Systems   HENT: Negative. Eyes: Positive for visual disturbance. Negative for photophobia, pain, discharge, redness and itching. Respiratory: Negative. Cardiovascular: Negative. Endocrine: Negative. Genitourinary: Negative. Musculoskeletal: Positive for gait problem. Negative for arthralgias, back pain, joint swelling, myalgias, neck pain and neck stiffness. Skin: Positive for wound. Negative for color change, pallor and rash. Allergic/Immunologic: Negative. Neurological: Positive for weakness.  Negative for dizziness, tremors, seizures, syncope, facial asymmetry, speech difficulty, consciousness ([address]    Status post colostomy takedown - Primary      Other Visit Diagnoses    None. PLAN:  Discussed with the patient the surgical procedure, she understood the information discussed, all questions have been answered. Patient has been advised of the findings of cyst of the ovaries as well as the Left fallopian tube, as well as a small pocket of pus,  which we cultured. she understood the information. Removed the staples from the abdominal region today using a staple remover, with very minimal bleeding, patient tolerated this well. 2x2 gauze has been placed inside the opening with saline on the tip of the gauze, with overlapping 2x2 mateo and paper tape applied. Return in about 3 weeks (around 12/19/2017) for s/p ostomy reversal .    I Sydnee Mckeon MA am scribing for and in the presence of Sivakumar Adair MD on this date of 11/28/17     I Issac Bray MD personally performed the services described in this documentation as scribed by the Medical Assistant Sydnee Mckeon  in my presence and it is both accurate and complete.           Electronically signed by Sivakumar Adair MD on 11/28/2017 at 5:58 PM

## 2017-12-05 ENCOUNTER — TELEPHONE (OUTPATIENT)
Dept: FAMILY MEDICINE CLINIC | Age: 47
End: 2017-12-05

## 2017-12-05 DIAGNOSIS — Z00.00 ROUTINE GENERAL MEDICAL EXAMINATION AT A HEALTH CARE FACILITY: Primary | ICD-10-CM

## 2017-12-05 NOTE — TELEPHONE ENCOUNTER
Labs reordered. Called Bill to advise/fast 12 hours prior/have labs done at least 3 days prior to appt.

## 2017-12-12 DIAGNOSIS — Z00.00 ROUTINE GENERAL MEDICAL EXAMINATION AT A HEALTH CARE FACILITY: ICD-10-CM

## 2017-12-12 DIAGNOSIS — Z13.1 SCREENING FOR DIABETES MELLITUS: ICD-10-CM

## 2017-12-12 DIAGNOSIS — E03.9 ACQUIRED HYPOTHYROIDISM: ICD-10-CM

## 2017-12-12 DIAGNOSIS — Z13.220 SCREENING CHOLESTEROL LEVEL: ICD-10-CM

## 2017-12-12 LAB
ANION GAP SERPL CALCULATED.3IONS-SCNC: 14 MMOL/L (ref 3–16)
BUN BLDV-MCNC: 14 MG/DL (ref 7–20)
CALCIUM SERPL-MCNC: 9.4 MG/DL (ref 8.3–10.6)
CHLORIDE BLD-SCNC: 103 MMOL/L (ref 99–110)
CHOLESTEROL, TOTAL: 113 MG/DL (ref 0–199)
CO2: 25 MMOL/L (ref 21–32)
CREAT SERPL-MCNC: 0.5 MG/DL (ref 0.6–1.1)
GFR AFRICAN AMERICAN: >60
GFR NON-AFRICAN AMERICAN: >60
GLUCOSE BLD-MCNC: 94 MG/DL (ref 70–99)
HDLC SERPL-MCNC: 40 MG/DL (ref 40–60)
LDL CHOLESTEROL CALCULATED: 55 MG/DL
POTASSIUM SERPL-SCNC: 4.3 MMOL/L (ref 3.5–5.1)
SODIUM BLD-SCNC: 142 MMOL/L (ref 136–145)
TRIGL SERPL-MCNC: 88 MG/DL (ref 0–150)
TSH REFLEX: 3.69 UIU/ML (ref 0.27–4.2)
VLDLC SERPL CALC-MCNC: 18 MG/DL

## 2017-12-15 ENCOUNTER — OFFICE VISIT (OUTPATIENT)
Dept: SURGERY | Age: 47
End: 2017-12-15

## 2017-12-15 VITALS
BODY MASS INDEX: 24.75 KG/M2 | HEIGHT: 64 IN | HEART RATE: 87 BPM | WEIGHT: 145 LBS | DIASTOLIC BLOOD PRESSURE: 69 MMHG | SYSTOLIC BLOOD PRESSURE: 103 MMHG

## 2017-12-15 DIAGNOSIS — Z98.890 STATUS POST COLOSTOMY TAKEDOWN: Primary | ICD-10-CM

## 2017-12-15 DIAGNOSIS — S06.6X9A: ICD-10-CM

## 2017-12-15 DIAGNOSIS — S32.009A CLOSED FRACTURE OF LUMBAR VERTEBRA, UNSPECIFIED FRACTURE MORPHOLOGY, UNSPECIFIED LUMBAR VERTEBRAL LEVEL, INITIAL ENCOUNTER (HCC): ICD-10-CM

## 2017-12-15 DIAGNOSIS — S06.9X0S LATE EFFECT OF BRAIN INJURY (HCC): ICD-10-CM

## 2017-12-15 PROCEDURE — 99024 POSTOP FOLLOW-UP VISIT: CPT | Performed by: SURGERY

## 2017-12-15 NOTE — PATIENT INSTRUCTIONS
Patient is doing well doing, the incision apears to be healing properly. Patient has been advised to return to our office as needed.

## 2017-12-15 NOTE — PROGRESS NOTES
sennosides-docusate sodium (SENOKOT-S) 8.6-50 MG tablet, Take 1 tablet by mouth daily, Disp: , Rfl:     Cephalexin and Pcn [penicillins]    Vitals:    12/15/17 1400   BP: 103/69   Pulse: 87   Weight: 145 lb (65.8 kg)   Height: 5' 4\" (1.626 m)       Orders Only on 12/12/2017   Component Date Value Ref Range Status    Sodium 12/12/2017 142  136 - 145 mmol/L Final    Potassium 12/12/2017 4.3  3.5 - 5.1 mmol/L Final    Chloride 12/12/2017 103  99 - 110 mmol/L Final    CO2 12/12/2017 25  21 - 32 mmol/L Final    Anion Gap 12/12/2017 14  3 - 16 Final    Glucose 12/12/2017 94  70 - 99 mg/dL Final    BUN 12/12/2017 14  7 - 20 mg/dL Final    CREATININE 12/12/2017 0.5* 0.6 - 1.1 mg/dL Final    GFR Non- 12/12/2017 >60  >60 Final    Comment: >60 mL/min/1.73m2 EGFR, calc. for ages 25 and older using the  MDRD formula (not corrected for weight), is valid for stable  renal function.  GFR  12/12/2017 >60  >60 Final    Comment: Chronic Kidney Disease: less than 60 ml/min/1.73 sq.m. Kidney Failure: less than 15 ml/min/1.73 sq.m. Results valid for patients 18 years and older.  Calcium 12/12/2017 9.4  8.3 - 10.6 mg/dL Final    TSH 12/12/2017 3.69  0.27 - 4.20 uIU/mL Final    Cholesterol, Total 12/12/2017 113  0 - 199 mg/dL Final    Triglycerides 12/12/2017 88  0 - 150 mg/dL Final    HDL 12/12/2017 40  40 - 60 mg/dL Final    LDL Calculated 12/12/2017 55  <100 mg/dL Final    VLDL CHOLESTEROL CALCULATED 12/12/2017 18  Not Established mg/dL Final       No results found. Review of Systems    Physical Exam   Constitutional: She is oriented to person, place, and time. Vital signs are normal. She appears well-developed and well-nourished. Non-toxic appearance. She does not have a sickly appearance. She does not appear ill. No distress. HENT:   Head: Normocephalic and atraumatic.        Right Ear: External ear normal.   Left Ear: External ear normal.   Eyes: Pupils are adenopathy present. She has no cervical adenopathy. Right cervical: No superficial cervical, no deep cervical and no posterior cervical adenopathy present. Left cervical: No superficial cervical, no deep cervical and no posterior cervical adenopathy present. Right axillary: No pectoral and no lateral adenopathy present. Left axillary: No pectoral and no lateral adenopathy present. Right: No inguinal and no supraclavicular adenopathy present. Left: No inguinal and no supraclavicular adenopathy present. Neurological: She is oriented to person, place, and time. She displays no atrophy. No cranial nerve deficit or sensory deficit. She exhibits normal muscle tone. Coordination and gait normal.   Short term memory loss due to accident. Left side weakness (arm and leg)   Skin: Skin is warm and dry. No bruising, no laceration, no lesion and no rash noted. No cyanosis or erythema. No pallor. Abdominal wound measurements 11/28/2017  12 mm x 5 mm  Depth: 1 cm    Psychiatric: She has a normal mood and affect. Her speech is normal and behavior is normal. Judgment and thought content normal. Cognition and memory are normal.         ASSESSMENT:    Problem List Items Addressed This Visit     Late effect of brain injury ([address]    Fracture of lumbar vertebra (HCC)    Subarachnoid hemorrhage following injury without open intracranial wound and with loss of consciousness ([address]    Status post colostomy takedown - Primary      Other Visit Diagnoses    None. PLAN:  Patient is doing well doing, the incision apears to be healing properly. Patient has been advised to return to our office as needed.            I Lamberto Coffey MA am scribing for and in the presence of Jeremie Bell MD on this date of 12/15/17     I Nagi Obrien MD personally performed the services described in this documentation as scribed by the Medical Assistant Lamberto Coffey  in my presence and it is both accurate and complete.           Electronically signed by Brandan Gaitan MD on 12/15/2017 at 2:11 PM

## 2017-12-19 ENCOUNTER — OFFICE VISIT (OUTPATIENT)
Dept: FAMILY MEDICINE CLINIC | Age: 47
End: 2017-12-19

## 2017-12-19 VITALS
DIASTOLIC BLOOD PRESSURE: 60 MMHG | HEART RATE: 82 BPM | HEIGHT: 64 IN | TEMPERATURE: 98.3 F | WEIGHT: 148 LBS | SYSTOLIC BLOOD PRESSURE: 90 MMHG | BODY MASS INDEX: 25.27 KG/M2

## 2017-12-19 DIAGNOSIS — S06.9X0S LATE EFFECT OF BRAIN INJURY (HCC): ICD-10-CM

## 2017-12-19 DIAGNOSIS — Z00.00 PREVENTATIVE HEALTH CARE: Primary | ICD-10-CM

## 2017-12-19 DIAGNOSIS — Z12.39 SCREENING FOR BREAST CANCER: ICD-10-CM

## 2017-12-19 DIAGNOSIS — Z97.0 PRESENCE OF ARTIFICIAL LEFT EYE: ICD-10-CM

## 2017-12-19 DIAGNOSIS — G81.91 RIGHT HEMIPARESIS (HCC): ICD-10-CM

## 2017-12-19 DIAGNOSIS — Z87.828 HISTORY OF MULTIPLE TRAUMA: ICD-10-CM

## 2017-12-19 DIAGNOSIS — Z80.3 FAMILY HISTORY OF BREAST CANCER IN MOTHER: ICD-10-CM

## 2017-12-19 PROCEDURE — 99396 PREV VISIT EST AGE 40-64: CPT | Performed by: FAMILY MEDICINE

## 2017-12-19 RX ORDER — LEVOTHYROXINE SODIUM 0.07 MG/1
75 TABLET ORAL DAILY
Qty: 90 TABLET | Refills: 3 | Status: SHIPPED | OUTPATIENT
Start: 2017-12-19 | End: 2018-11-26 | Stop reason: SDUPTHER

## 2017-12-19 ASSESSMENT — PATIENT HEALTH QUESTIONNAIRE - PHQ9
1. LITTLE INTEREST OR PLEASURE IN DOING THINGS: 0
SUM OF ALL RESPONSES TO PHQ9 QUESTIONS 1 & 2: 0
2. FEELING DOWN, DEPRESSED OR HOPELESS: 0
SUM OF ALL RESPONSES TO PHQ QUESTIONS 1-9: 0

## 2017-12-21 PROBLEM — R56.9 SEIZURE (HCC): Status: RESOLVED | Noted: 2017-03-07 | Resolved: 2017-12-21

## 2017-12-21 PROBLEM — Z87.828 HISTORY OF MULTIPLE TRAUMA: Status: ACTIVE | Noted: 2017-12-21

## 2017-12-21 ASSESSMENT — ENCOUNTER SYMPTOMS: SHORTNESS OF BREATH: 0

## 2017-12-21 NOTE — PROGRESS NOTES
Closed fracture of calcaneus    Depression    Late effect of brain injury ([address]    Fracture of lumbar vertebra ([address]    Laceration of spleen    Subarachnoid hemorrhage following injury without open intracranial wound and with loss of consciousness ([address]    Need for vaccination    Status post colostomy takedown    History of multiple trauma     Health Maintenance reviewed -- see chart. OBJECTIVE  Physical Exam   Constitutional: She is oriented to person, place, and time. She appears well-developed and well-nourished. HENT:   Head: Normocephalic and atraumatic. Eyes: Conjunctivae and EOM are normal. Right eye exhibits no discharge. Left eye exhibits no discharge. Neck: Normal range of motion. Neck supple. No thyromegaly present. Cardiovascular: Normal rate, regular rhythm and normal heart sounds. No murmur heard. Pulmonary/Chest: Effort normal and breath sounds normal. She has no wheezes. Abdominal: Soft. Bowel sounds are normal. She exhibits no distension and no mass. There is no tenderness. Musculoskeletal: She exhibits no edema or tenderness. Lymphadenopathy:     She has no cervical adenopathy. Neurological: She is alert and oriented to person, place, and time. She displays no tremor. No cranial nerve deficit or sensory deficit. Coordination and gait abnormal.   Skin: Skin is warm and dry. No rash noted. Psychiatric: She has a normal mood and affect. Her behavior is normal. Judgment and thought content normal.   Vitals reviewed.       allergies  Cephalexin and Pcn [penicillins]  Current Outpatient Prescriptions   Medication Sig Dispense Refill    levothyroxine (LEVOTHROID) 75 MCG tablet Take 1 tablet by mouth daily 90 tablet 3    Multiple Vitamins-Minerals (THERAPEUTIC MULTIVITAMIN-MINERALS) tablet Take 1 tablet by mouth daily      sennosides-docusate sodium (SENOKOT-S) 8.6-50 MG tablet Take 1 tablet by mouth daily       No current facility-administered medications for this visit.      Rodrigo Marques:    12/19/17 1036   BP: 90/60   Pulse: 82   Temp: 98.3 °F (36.8 °C)     Wt Readings from Last 3 Encounters:   12/19/17 148 lb (67.1 kg)   12/15/17 145 lb (65.8 kg)   11/28/17 145 lb (65.8 kg)     Body mass index is 25.4 kg/m².   Immunization History   Administered Date(s) Administered    Influenza, Quadv, 3 yrs and older, IM, Preservative Free 10/30/2017    Tdap (Boostrix, Adacel) 04/02/2009     Lab Review   Orders Only on 12/12/2017   Component Date Value    Sodium 12/12/2017 142     Potassium 12/12/2017 4.3     Chloride 12/12/2017 103     CO2 12/12/2017 25     Anion Gap 12/12/2017 14     Glucose 12/12/2017 94     BUN 12/12/2017 14     CREATININE 12/12/2017 0.5*    GFR Non- 12/12/2017 >60     GFR  12/12/2017 >60     Calcium 12/12/2017 9.4     TSH 12/12/2017 3.69     Cholesterol, Total 12/12/2017 113     Triglycerides 12/12/2017 88     HDL 12/12/2017 40     LDL Calculated 12/12/2017 55     VLDL CHOLESTEROL CALCULA* 12/12/2017 18    Admission on 11/08/2017, Discharged on 11/13/2017   Component Date Value    Sodium 11/08/2017 137     Potassium 11/08/2017 5.7*    Chloride 11/08/2017 101     CO2 11/08/2017 24     Anion Gap 11/08/2017 12     Glucose 11/08/2017 106*    BUN 11/08/2017 10     CREATININE 11/08/2017 <0.5*    GFR Non- 11/08/2017 >60     GFR  11/08/2017 >60     Calcium 11/08/2017 9.1     WBC 11/08/2017 10.8     RBC 11/08/2017 4.21     Hemoglobin 11/08/2017 12.9     Hematocrit 11/08/2017 39.4     MCV 11/08/2017 93.7     MCH 11/08/2017 30.7     MCHC 11/08/2017 32.8     RDW 11/08/2017 13.7     Platelets 69/53/7036 315     MPV 11/08/2017 10.6*    Ventricular Rate 11/14/2017 90     Atrial Rate 11/14/2017 90     P-R Interval 11/14/2017 144     QRS Duration 11/14/2017 60     Q-T Interval 11/14/2017 374     QTc Calculation (Bazett) 11/14/2017 457     P Axis 11/14/2017 52     R Axis 11/14/2017 6     T Axis 11/14/2017 28     Diagnosis 11/14/2017                      Value:Normal sinus rhythm  Normal ECG  No previous ECGs available  Confirmed by [facility] MD, Destiny Chiang ([id number]) on 11/8/2017 10:39:05 AM      ABO/Rh 11/08/2017 CANCELED     Antibody Screen 11/08/2017 NEG     hCG Qual 11/08/2017 Negative     ABO/Rh 11/08/2017 O POS     Gram Stain Result 11/13/2017                      Value:No WBC's seen  2+ Gram positive cocci      Culture Surgical 11/13/2017 No growth 60-72 hours     Anaerobic Culture 11/13/2017 No anaerobes isolated     Sodium 11/09/2017 139     Potassium 11/09/2017 3.7     Chloride 11/09/2017 105     CO2 11/09/2017 23     Anion Gap 11/09/2017 11     Glucose 11/09/2017 108*    BUN 11/09/2017 7     CREATININE 11/09/2017 <0.5*    GFR Non- 11/09/2017 >60     GFR  11/09/2017 >60     Calcium 11/09/2017 7.8*    WBC 11/09/2017 14.5*    RBC 11/09/2017 3.21*    Hemoglobin 11/09/2017 10.0*    Hematocrit 11/09/2017 29.9*    MCV 11/09/2017 93.0     MCH 11/09/2017 31.1     MCHC 11/09/2017 33.4     RDW 11/09/2017 13.3     Platelets 68/93/7694 231     MPV 11/09/2017 10.0     WBC 11/10/2017 10.7     RBC 11/10/2017 3.21*    Hemoglobin 11/10/2017 9.9*    Hematocrit 11/10/2017 30.3*    MCV 11/10/2017 94.4     MCH 11/10/2017 30.9     MCHC 11/10/2017 32.7     RDW 11/10/2017 13.3     Platelets 42/82/5350 245     MPV 11/10/2017 9.4     Sodium 11/10/2017 137     Potassium 11/10/2017 3.4*    Chloride 11/10/2017 101     CO2 11/10/2017 22     Anion Gap 11/10/2017 14     Glucose 11/10/2017 100*    BUN 11/10/2017 5*    CREATININE 11/10/2017 <0.5*    GFR Non- 11/10/2017 >60     GFR  11/10/2017 >60     Calcium 11/10/2017 7.9*    WBC 11/13/2017 7.5     RBC 11/13/2017 3.31*    Hemoglobin 11/13/2017 10.2*    Hematocrit 11/13/2017 30.6*    MCV 11/13/2017 92.6     MCH 11/13/2017 30.8     MCHC 11/13/2017 33.3     RDW 11/13/2017 13.3     Platelets 91/31/5258 345     MPV 11/13/2017 8.5     Sodium 11/13/2017 140     Potassium 11/13/2017 3.6     Chloride 11/13/2017 104     CO2 11/13/2017 23     Anion Gap 11/13/2017 13     Glucose 11/13/2017 112*    BUN 11/13/2017 5*    CREATININE 11/13/2017 <0.5*    GFR Non- 11/13/2017 >60     GFR  11/13/2017 >60     Calcium 11/13/2017 8.5              ASSESSMENT  1. Preventative health care  Basic Metabolic Panel    Lipid Panel    TSH with Reflex   2. Late effect of brain injury ([facility]     3. Screening for breast cancer  AMANDA DIGITAL SCREEN W OR WO CAD BILATERAL   4. Right hemiparesis ([facility]     5. History of multiple trauma     6. Presence of artificial left eye     7. Family history of breast cancer in mother         PLAN  Cont with home physical therapy and cognitave exercises. Stable on current meds. Continue with current meds.   New meds this visit:    Orders Placed This Encounter   Medications    levothyroxine (LEVOTHROID) 75 MCG tablet     Sig: Take 1 tablet by mouth daily     Dispense:  90 tablet     Refill:  3       New orders placed this visit:    Orders Placed This Encounter   Procedures    AMANDA DIGITAL SCREEN W OR WO CAD BILATERAL     Standing Status:   Future     Standing Expiration Date:   2/19/2019    Basic Metabolic Panel     Standing Status:   Future     Standing Expiration Date:   3/19/2019    Lipid Panel     Standing Status:   Future     Standing Expiration Date:   3/19/2019     Order Specific Question:   Is Patient Fasting?/# of Hours     Answer:   12 hours    TSH with Reflex     Standing Status:   Future     Standing Expiration Date:   3/19/2019       Follow - up:  Return in about 1 year (around 12/19/2018) for 30 min yearly physical exam.    continue with the following meds:    Outpatient Encounter Prescriptions as of 12/19/2017   Medication Sig Dispense Refill    levothyroxine (LEVOTHROID) 75 MCG tablet Take 1

## 2018-03-06 ENCOUNTER — TELEPHONE (OUTPATIENT)
Dept: FAMILY MEDICINE CLINIC | Age: 48
End: 2018-03-06

## 2018-03-06 DIAGNOSIS — R29.898 RIGHT ARM WEAKNESS: Primary | ICD-10-CM

## 2018-03-13 NOTE — TELEPHONE ENCOUNTER
Arley Pandey PT ordered placed. Asked if PT could be done in home instead of OP? I adv her to call [place] [phone] to discuss if would be covered by insurance.

## 2018-04-17 ENCOUNTER — OFFICE VISIT (OUTPATIENT)
Dept: FAMILY MEDICINE CLINIC | Age: 48
End: 2018-04-17

## 2018-04-17 VITALS
TEMPERATURE: 98.2 F | HEIGHT: 64 IN | WEIGHT: 142.4 LBS | SYSTOLIC BLOOD PRESSURE: 110 MMHG | HEART RATE: 88 BPM | BODY MASS INDEX: 24.31 KG/M2 | DIASTOLIC BLOOD PRESSURE: 60 MMHG

## 2018-04-17 DIAGNOSIS — G89.29 CHRONIC RIGHT SHOULDER PAIN: ICD-10-CM

## 2018-04-17 DIAGNOSIS — Z87.828 HISTORY OF MULTIPLE TRAUMA: ICD-10-CM

## 2018-04-17 DIAGNOSIS — G81.91 RIGHT HEMIPARESIS (HCC): Primary | ICD-10-CM

## 2018-04-17 DIAGNOSIS — S06.9X0S LATE EFFECT OF BRAIN INJURY (HCC): ICD-10-CM

## 2018-04-17 DIAGNOSIS — M25.511 CHRONIC RIGHT SHOULDER PAIN: ICD-10-CM

## 2018-04-17 PROCEDURE — 99213 OFFICE O/P EST LOW 20 MIN: CPT | Performed by: FAMILY MEDICINE

## 2018-04-27 ENCOUNTER — TELEPHONE (OUTPATIENT)
Dept: FAMILY MEDICINE CLINIC | Age: 48
End: 2018-04-27

## 2018-05-02 NOTE — TELEPHONE ENCOUNTER
Pt calling for physical & occupational therapy orders to be faxed to [facility].  [phone]      Please advise,

## 2018-05-09 ENCOUNTER — TELEPHONE (OUTPATIENT)
Dept: FAMILY MEDICINE CLINIC | Age: 48
End: 2018-05-09

## 2018-05-10 ENCOUNTER — TELEPHONE (OUTPATIENT)
Dept: FAMILY MEDICINE CLINIC | Age: 48
End: 2018-05-10

## 2018-05-10 DIAGNOSIS — R20.2 HEMIPARESTHESIA: Primary | ICD-10-CM

## 2018-05-10 NOTE — TELEPHONE ENCOUNTER
Evangelina rodriguez/ [facility] is needing New orders   With current date for PT and Most current insurance info     Pl Fax [phone]

## 2018-05-11 ASSESSMENT — ENCOUNTER SYMPTOMS
ABDOMINAL PAIN: 0
SHORTNESS OF BREATH: 0

## 2018-05-22 ENCOUNTER — TELEPHONE (OUTPATIENT)
Dept: FAMILY MEDICINE CLINIC | Age: 48
End: 2018-05-22

## 2018-05-31 ENCOUNTER — TELEPHONE (OUTPATIENT)
Dept: FAMILY MEDICINE CLINIC | Age: 48
End: 2018-05-31

## 2018-05-31 DIAGNOSIS — G81.91 RIGHT HEMIPARESIS (HCC): Primary | ICD-10-CM

## 2018-05-31 PROCEDURE — G0180 MD CERTIFICATION HHA PATIENT: HCPCS | Performed by: FAMILY MEDICINE

## 2018-06-22 ENCOUNTER — TELEPHONE (OUTPATIENT)
Dept: FAMILY MEDICINE CLINIC | Age: 48
End: 2018-06-22

## 2018-12-07 RX ORDER — LEVOTHYROXINE SODIUM 0.07 MG/1
75 TABLET ORAL DAILY
Qty: 90 TABLET | Refills: 1 | Status: SHIPPED | OUTPATIENT
Start: 2018-12-07 | End: 2019-05-20 | Stop reason: SDUPTHER

## 2019-05-20 DIAGNOSIS — Z00.00 ROUTINE GENERAL MEDICAL EXAMINATION AT A HEALTH CARE FACILITY: Primary | ICD-10-CM

## 2019-05-20 RX ORDER — LEVOTHYROXINE SODIUM 0.07 MG/1
75 TABLET ORAL DAILY
Qty: 90 TABLET | Refills: 0 | Status: SHIPPED | OUTPATIENT
Start: 2019-05-20 | End: 2019-07-18 | Stop reason: SDUPTHER

## 2019-07-15 DIAGNOSIS — Z00.00 ROUTINE GENERAL MEDICAL EXAMINATION AT A HEALTH CARE FACILITY: ICD-10-CM

## 2019-07-15 LAB
A/G RATIO: 1.6 (ref 1.1–2.2)
ALBUMIN SERPL-MCNC: 4.6 G/DL (ref 3.4–5)
ALP BLD-CCNC: 77 U/L (ref 40–129)
ALT SERPL-CCNC: 11 U/L (ref 10–40)
ANION GAP SERPL CALCULATED.3IONS-SCNC: 13 MMOL/L (ref 3–16)
AST SERPL-CCNC: 16 U/L (ref 15–37)
BILIRUB SERPL-MCNC: 0.3 MG/DL (ref 0–1)
BUN BLDV-MCNC: 14 MG/DL (ref 7–20)
CALCIUM SERPL-MCNC: 9.7 MG/DL (ref 8.3–10.6)
CHLORIDE BLD-SCNC: 105 MMOL/L (ref 99–110)
CHOLESTEROL, FASTING: 98 MG/DL (ref 0–199)
CO2: 25 MMOL/L (ref 21–32)
CREAT SERPL-MCNC: 0.6 MG/DL (ref 0.6–1.1)
GFR AFRICAN AMERICAN: >60
GFR NON-AFRICAN AMERICAN: >60
GLOBULIN: 2.8 G/DL
GLUCOSE BLD-MCNC: 89 MG/DL (ref 70–99)
HDLC SERPL-MCNC: 43 MG/DL (ref 40–60)
LDL CHOLESTEROL CALCULATED: 41 MG/DL
POTASSIUM SERPL-SCNC: 5.1 MMOL/L (ref 3.5–5.1)
SODIUM BLD-SCNC: 143 MMOL/L (ref 136–145)
TOTAL PROTEIN: 7.4 G/DL (ref 6.4–8.2)
TRIGLYCERIDE, FASTING: 68 MG/DL (ref 0–150)
TSH REFLEX: 2.04 UIU/ML (ref 0.27–4.2)
VLDLC SERPL CALC-MCNC: 14 MG/DL

## 2019-07-18 ENCOUNTER — OFFICE VISIT (OUTPATIENT)
Dept: FAMILY MEDICINE CLINIC | Age: 49
End: 2019-07-18
Payer: COMMERCIAL

## 2019-07-18 VITALS
BODY MASS INDEX: 24.52 KG/M2 | HEIGHT: 64 IN | SYSTOLIC BLOOD PRESSURE: 102 MMHG | DIASTOLIC BLOOD PRESSURE: 60 MMHG | WEIGHT: 143.6 LBS | TEMPERATURE: 98.4 F

## 2019-07-18 DIAGNOSIS — Z12.39 SCREENING FOR BREAST CANCER: ICD-10-CM

## 2019-07-18 DIAGNOSIS — E03.9 ACQUIRED HYPOTHYROIDISM: ICD-10-CM

## 2019-07-18 DIAGNOSIS — G81.91 RIGHT HEMIPARESIS (HCC): ICD-10-CM

## 2019-07-18 DIAGNOSIS — Z00.00 PREVENTATIVE HEALTH CARE: Primary | ICD-10-CM

## 2019-07-18 PROCEDURE — 99396 PREV VISIT EST AGE 40-64: CPT | Performed by: FAMILY MEDICINE

## 2019-07-18 RX ORDER — LEVOTHYROXINE SODIUM 0.07 MG/1
75 TABLET ORAL DAILY
Qty: 90 TABLET | Refills: 3 | Status: SHIPPED | OUTPATIENT
Start: 2019-07-18 | End: 2020-11-16 | Stop reason: SDUPTHER

## 2019-08-05 ASSESSMENT — ENCOUNTER SYMPTOMS
CONSTIPATION: 0
NAUSEA: 0
BLOOD IN STOOL: 0
SHORTNESS OF BREATH: 0
DIARRHEA: 0
ABDOMINAL PAIN: 0
EYE PAIN: 0
VOMITING: 0

## 2019-08-13 ENCOUNTER — HOSPITAL ENCOUNTER (OUTPATIENT)
Dept: PHYSICAL THERAPY | Age: 49
Setting detail: THERAPIES SERIES
Discharge: HOME | End: 2019-08-13
Payer: COMMERCIAL

## 2019-09-04 ENCOUNTER — HOSPITAL ENCOUNTER (OUTPATIENT)
Dept: PHYSICAL THERAPY | Age: 49
Setting detail: THERAPIES SERIES
Discharge: HOME | End: 2019-09-04
Payer: MEDICARE

## 2019-09-04 PROCEDURE — 97530 THERAPEUTIC ACTIVITIES: CPT

## 2019-09-04 PROCEDURE — 97110 THERAPEUTIC EXERCISES: CPT

## 2019-09-04 PROCEDURE — 97163 PT EVAL HIGH COMPLEX 45 MIN: CPT

## 2019-09-04 NOTE — FLOWSHEET NOTE
uses grab bar. Subjective:       Objective:  See Initial Evaluation  Observation:   Test measurements:      Exercises:  Exercise/Equipment Resistance/Repetitions Other comments                                                                            Other Therapeutic Activities:  9/4/19:  Discussed at length neurologic deficit from TBI, muscle reeducation, motor recruitment. Home Exercise Program:  9/4/18:   Patient instructed in lower trap sets, flexion shoulder slides on table, forearm pronation/supination, thumb abd/add; written instructions with pictures issued, patient able to demonstrate exercises. Manual Treatments:      Modalities:      Timed Code Treatment Minutes:  30    Total Treatment Minutes:  75    Assessment  [x] Patient tolerated treatment well [] Patient limited by fatigue  [] Patient limited by pain  [] Patient limited by other medical complications  [x] Other: PT initiated this date. Prognosis: [x] Good [x] Fair  [] Poor    Patient Requires Follow-up: [x] Yes  [] No    Plan:   [] Continue per plan of care [] Alter current plan (see comments)  [x] Plan of care initiated [] Hold pending MD visit [] Discharge  Plan for Next Session:  Initiate manual to R shoulder, PROM.       Electronically signed by:  Rachel Burnham, [address] [place] [address],[facility]

## 2019-09-04 NOTE — PLAN OF CARE
Outpatient Physical Therapy  [] [facility]    Phone: [phone]   Fax: [phone]   [] [facility]  Phone: [phone]              Fax: [phone]  [x] [facility]   Phone: [phone]   Fax: [phone]     To: Referring Practitioner: Michael Reis. Shannen Nunez DO      Patient: Twilla Gowers   : 1970   MRN: [id number]  Evaluation Date: 2019      Diagnosis Information:  · Diagnosis: Right Hemiparesis G81.91   · Treatment Diagnosis: Right Hemiparesis, Decreased functional mobility     Physical Therapy Certification Form  Dear Dr. Shannen Nunez  The following patient has been evaluated for physical therapy services and for therapy to continue, Medicare requires monthly physician review of the treatment plan. Please review the attached evaluation and/or summary of the patient's plan of care, and verify that you agree therapy should continue by signing the attached document and sending it back to our office. Plan of Care/Treatment to date:  [x] Therapeutic Exercise    [x] Modalities:  [x] Therapeutic Activity     [] Ultrasound  [] Electrical Stimulation  [x] Gait Training      [] Cervical Traction [] Lumbar Traction  [x] Neuromuscular Re-education    [] Cold/hotpack [] Iontophoresis   [x] Instruction in HEP     Other:  [x] Manual Therapy      []             [] Aquatic Therapy      []           ? Frequency/Duration: 1-3 times per week for 4-6 weeks    Rehab Potential: [] Excellent [x] Good [x] Fair  [] Poor       Electronically signed by:  Janie Williamson, PT [id number]      If you have any questions or concerns, please don't hesitate to call.   Thank you for your referral.      Physician Signature:________________________________Date:__________________  By signing above, therapists plan is approved by physician

## 2019-09-05 ENCOUNTER — TELEPHONE (OUTPATIENT)
Dept: FAMILY MEDICINE CLINIC | Age: 49
End: 2019-09-05

## 2019-09-05 DIAGNOSIS — G81.91 RIGHT HEMIPARESIS (HCC): Primary | ICD-10-CM

## 2019-09-05 DIAGNOSIS — G89.29 CHRONIC RIGHT SHOULDER PAIN: ICD-10-CM

## 2019-09-05 DIAGNOSIS — S06.9X0S LATE EFFECT OF BRAIN INJURY (HCC): ICD-10-CM

## 2019-09-05 DIAGNOSIS — M25.511 CHRONIC RIGHT SHOULDER PAIN: ICD-10-CM

## 2020-11-16 ENCOUNTER — OFFICE VISIT (OUTPATIENT)
Dept: FAMILY MEDICINE CLINIC | Age: 50
End: 2020-11-16
Payer: MEDICARE

## 2020-11-16 VITALS
DIASTOLIC BLOOD PRESSURE: 72 MMHG | BODY MASS INDEX: 28.34 KG/M2 | TEMPERATURE: 98.2 F | WEIGHT: 166 LBS | HEIGHT: 64 IN | HEART RATE: 88 BPM | SYSTOLIC BLOOD PRESSURE: 114 MMHG

## 2020-11-16 PROCEDURE — G8419 CALC BMI OUT NRM PARAM NOF/U: HCPCS | Performed by: FAMILY MEDICINE

## 2020-11-16 PROCEDURE — 3017F COLORECTAL CA SCREEN DOC REV: CPT | Performed by: FAMILY MEDICINE

## 2020-11-16 PROCEDURE — 99213 OFFICE O/P EST LOW 20 MIN: CPT | Performed by: FAMILY MEDICINE

## 2020-11-16 PROCEDURE — G8482 FLU IMMUNIZE ORDER/ADMIN: HCPCS | Performed by: FAMILY MEDICINE

## 2020-11-16 PROCEDURE — 1036F TOBACCO NON-USER: CPT | Performed by: FAMILY MEDICINE

## 2020-11-16 PROCEDURE — 90686 IIV4 VACC NO PRSV 0.5 ML IM: CPT | Performed by: FAMILY MEDICINE

## 2020-11-16 PROCEDURE — G0008 ADMIN INFLUENZA VIRUS VAC: HCPCS | Performed by: FAMILY MEDICINE

## 2020-11-16 PROCEDURE — G8427 DOCREV CUR MEDS BY ELIG CLIN: HCPCS | Performed by: FAMILY MEDICINE

## 2020-11-16 RX ORDER — LEVOTHYROXINE SODIUM 0.07 MG/1
75 TABLET ORAL DAILY
Qty: 90 TABLET | Refills: 1 | Status: SHIPPED | OUTPATIENT
Start: 2020-11-16 | End: 2021-01-15

## 2020-11-16 ASSESSMENT — ENCOUNTER SYMPTOMS
ABDOMINAL PAIN: 0
COUGH: 0
SHORTNESS OF BREATH: 0
ABDOMINAL DISTENTION: 0
NAUSEA: 0
DIARRHEA: 0
BLOOD IN STOOL: 0
VOMITING: 0
TROUBLE SWALLOWING: 0
SORE THROAT: 0
CHEST TIGHTNESS: 0
CONSTIPATION: 0

## 2020-11-16 ASSESSMENT — PATIENT HEALTH QUESTIONNAIRE - PHQ9
SUM OF ALL RESPONSES TO PHQ QUESTIONS 1-9: 0
SUM OF ALL RESPONSES TO PHQ9 QUESTIONS 1 & 2: 0
2. FEELING DOWN, DEPRESSED OR HOPELESS: 0
SUM OF ALL RESPONSES TO PHQ QUESTIONS 1-9: 0
1. LITTLE INTEREST OR PLEASURE IN DOING THINGS: 0
SUM OF ALL RESPONSES TO PHQ QUESTIONS 1-9: 0

## 2020-11-16 NOTE — PATIENT INSTRUCTIONS
Do see the gynecologist for the routine checks  Restart the thyroid pill at one half pill a day for the next 3 weeks then go to one whole pill a day  After 2 months check the blood test  See back in the summer
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
## 2021-03-16 ENCOUNTER — TELEPHONE (OUTPATIENT)
Dept: FAMILY MEDICINE CLINIC | Age: 51
End: 2021-03-16

## 2021-04-16 ENCOUNTER — OFFICE VISIT (OUTPATIENT)
Dept: FAMILY MEDICINE CLINIC | Age: 51
End: 2021-04-16
Payer: MEDICARE

## 2021-04-16 VITALS
WEIGHT: 170 LBS | DIASTOLIC BLOOD PRESSURE: 78 MMHG | SYSTOLIC BLOOD PRESSURE: 124 MMHG | BODY MASS INDEX: 29.02 KG/M2 | TEMPERATURE: 98.2 F | HEIGHT: 64 IN

## 2021-04-16 DIAGNOSIS — E03.9 ACQUIRED HYPOTHYROIDISM: ICD-10-CM

## 2021-04-16 DIAGNOSIS — E03.9 ACQUIRED HYPOTHYROIDISM: Primary | ICD-10-CM

## 2021-04-16 PROCEDURE — G8427 DOCREV CUR MEDS BY ELIG CLIN: HCPCS | Performed by: FAMILY MEDICINE

## 2021-04-16 PROCEDURE — 99212 OFFICE O/P EST SF 10 MIN: CPT | Performed by: FAMILY MEDICINE

## 2021-04-16 PROCEDURE — 3017F COLORECTAL CA SCREEN DOC REV: CPT | Performed by: FAMILY MEDICINE

## 2021-04-16 PROCEDURE — 1036F TOBACCO NON-USER: CPT | Performed by: FAMILY MEDICINE

## 2021-04-16 PROCEDURE — G8419 CALC BMI OUT NRM PARAM NOF/U: HCPCS | Performed by: FAMILY MEDICINE

## 2021-04-16 RX ORDER — LEVOTHYROXINE SODIUM 0.07 MG/1
75 TABLET ORAL DAILY
Qty: 30 TABLET | Refills: 0 | Status: SHIPPED | OUTPATIENT
Start: 2021-04-16 | End: 2021-04-23

## 2021-04-16 ASSESSMENT — PATIENT HEALTH QUESTIONNAIRE - PHQ9
2. FEELING DOWN, DEPRESSED OR HOPELESS: 0
SUM OF ALL RESPONSES TO PHQ QUESTIONS 1-9: 0
SUM OF ALL RESPONSES TO PHQ9 QUESTIONS 1 & 2: 0
1. LITTLE INTEREST OR PLEASURE IN DOING THINGS: 0

## 2021-04-16 NOTE — PROGRESS NOTES
Subjective:      Patient ID: Randy Bernard is a 45 y.o. female. Chief Complaint   Patient presents with    Check-Up     thyroid         Patient presents with:  Check-Up: thyroid     Not clear as to why she is here  Here for the above  But we had given instructions at the last visit   None of which has been followed through    She tells me she is feeling well   She is with out c/o  She is here with her sister(?)    YOB: 1970    Date of Visit:  4/16/2021     -- Cephalexin    -- Pcn (Penicillins)     Current Outpatient Medications:  levothyroxine (SYNTHROID) 75 MCG tablet, Take 1 tablet by mouth Daily She may only have 30 as she has not done the testing, Disp: 30 tablet, Rfl: 0  Multiple Vitamins-Minerals (THERAPEUTIC MULTIVITAMIN-MINERALS) tablet, Take 1 tablet by mouth daily, Disp: , Rfl:   sennosides-docusate sodium (SENOKOT-S) 8.6-50 MG tablet, Take 1 tablet by mouth daily, Disp: , Rfl:     No current facility-administered medications for this visit.       ---------------------------               04/16/21                      1454         ---------------------------   BP:          124/78         Site:    Left Upper Arm     Position:     Sitting        Cuff Size:  Medium Adult      Temp:   98.2 °F (36.8 °C)   TempSrc:    Temporal        Weight: 170 lb (77.1 kg)    Height:  5' 4\" (1.626 m)   ---------------------------  Body mass index is 29.18 kg/m². Wt Readings from Last 3 Encounters:  04/16/21 : 170 lb (77.1 kg)  11/16/20 : 166 lb (75.3 kg)  07/18/19 : 143 lb 9.6 oz (65.1 kg)    BP Readings from Last 3 Encounters:  04/16/21 : 124/78  11/16/20 : 114/72  07/18/19 : 102/60        Review of Systems    Objective:   Physical Exam  Constitutional:       General: She is not in acute distress. Appearance: Normal appearance. She is not ill-appearing. Neurological:      Mental Status: She is alert. Assessment:       Diagnosis Orders   1.  Acquired

## 2021-04-17 LAB
T4 FREE: 1.4 NG/DL (ref 0.9–1.8)
TSH SERPL DL<=0.05 MIU/L-ACNC: 1.87 UIU/ML (ref 0.27–4.2)

## 2021-05-11 ENCOUNTER — TELEPHONE (OUTPATIENT)
Dept: FAMILY MEDICINE CLINIC | Age: 51
End: 2021-05-11

## 2021-05-20 ENCOUNTER — TELEPHONE (OUTPATIENT)
Dept: FAMILY MEDICINE CLINIC | Age: 51
End: 2021-05-20

## 2021-06-29 ENCOUNTER — HOSPITAL ENCOUNTER (OUTPATIENT)
Dept: WOMENS IMAGING | Age: 51
Discharge: HOME | End: 2021-06-29
Payer: MEDICARE

## 2021-06-29 DIAGNOSIS — Z12.31 VISIT FOR SCREENING MAMMOGRAM: ICD-10-CM

## 2021-06-29 PROCEDURE — 77063 BREAST TOMOSYNTHESIS BI: CPT

## 2021-07-09 ENCOUNTER — HOSPITAL ENCOUNTER (OUTPATIENT)
Dept: ULTRASOUND IMAGING | Age: 51
Discharge: HOME | End: 2021-07-09
Payer: MEDICARE

## 2021-07-09 ENCOUNTER — HOSPITAL ENCOUNTER (OUTPATIENT)
Dept: WOMENS IMAGING | Age: 51
Discharge: HOME | End: 2021-07-09
Payer: MEDICARE

## 2021-07-09 DIAGNOSIS — R92.8 ABNORMAL MAMMOGRAM: ICD-10-CM

## 2021-07-09 PROCEDURE — G0279 TOMOSYNTHESIS, MAMMO: HCPCS

## 2021-07-09 PROCEDURE — 76642 ULTRASOUND BREAST LIMITED: CPT

## 2021-12-14 ENCOUNTER — NURSE TRIAGE (OUTPATIENT)
Dept: OTHER | Facility: CLINIC | Age: 51
End: 2021-12-14

## 2021-12-14 ENCOUNTER — TELEPHONE (OUTPATIENT)
Dept: FAMILY MEDICINE CLINIC | Age: 51
End: 2021-12-14

## 2021-12-14 NOTE — TELEPHONE ENCOUNTER
----- Message from Yue Ashokyessenia sent at 12/14/2021 12:56 PM EST -----  Subject: Appointment Request    Reason for Call: Urgent (Patient Request) No Script    QUESTIONS  Type of Appointment? Established Patient  Reason for appointment request? No appointments available during search  Additional Information for Provider? Patient received a phone call about   her appointment today 12/14 being cx and a message saying she needs her   Ophthalmologist. She stated she already has an appointment with her eye   doctor but she has to see Dr. Soren Manzano for the medication for her eye  ---------------------------------------------------------------------------  --------------  Autoniq  What is the best way for the office to contact you? OK to leave message on   voicemail  Preferred Call Back Phone Number? [phone]  ---------------------------------------------------------------------------  --------------  SCRIPT ANSWERS  Relationship to Patient? Self  (Is the patient requesting to see the provider for a procedure?)? No  (Is the patient requesting to see the provider urgently  today or   tomorrow. )? Yes  Have you been diagnosed with, awaiting test results for, or told that you   are suspected of having COVID-19 (Coronavirus)? (If patient has tested   negative or was tested as a requirement for work, school, or travel and   not based on symptoms, answer no)? No  Within the past two weeks have you developed any of the following symptoms   (answer no if symptoms have been present longer than 2 weeks or began   more than 2 weeks ago)? Fever or Chills, Cough, Shortness of breath or   difficulty breathing, Loss of taste or smell, Sore throat, Nasal   congestion, Sneezing or runny nose, Fatigue or generalized body aches   (answer no if pain is specific to a body part e.g. back pain), Diarrhea,   Headache?  Yes

## 2021-12-14 NOTE — TELEPHONE ENCOUNTER
Lizzette Torres advised and verbalized understanding. Dr. Litzy Noe [phone] and Dr. Castillo Ragsdale [phone] was given to patient.

## 2021-12-14 NOTE — TELEPHONE ENCOUNTER
Brief description of triage: she thinks she has an infection in her left eye socket    Triage indicates for patient to go to the office now    Care advice provided, patient verbalizes understanding; denies any other questions or concerns; instructed to call back for any new or worsening symptoms. Writer provided warm transfer to [place] [facility] for appointment scheduling. Attention Provider: Thank you for allowing me to participate in the care of your patient. The patient was connected to triage in response to information provided to the ECC/PSC. Please do not respond through this encounter as the response is not directed to a shared pool. Reason for Disposition   Facial wound looks infected (spreading redness)    Answer Assessment - Initial Assessment Questions  1. LOCATION: \"Where is the wound located? \"       Left eye- prosthetic     2. WOUND APPEARANCE: \"What does the wound look like? \"       Swollen, red, and clear drainage    3. SIZE: If redness is present, ask: \"What is the size of the red area? \" (Inches, centimeters, or compare to size of a coin)       Eye socket    4. SPREAD: \"What's changed in the last day? \"  \"Do you see any red streaks coming from the wound? \"      No change in the past day    5. ONSET: \"When did it start to look infected? \"       2 days ago    6. MECHANISM: \"How did the wound start, what was the cause? \"      Prosthetic  eye    7. PAIN: \"Is there any pain? \" If so, ask: \"How bad is the pain? \"   (Scale 1-10; or mild, moderate, severe)      Painful to touch    8. FEVER: \"Do you have a fever? \" If so, ask: \"What is your temperature, how was it measured, and when did it start? \"      Denies    9. OTHER SYMPTOMS: \"Do you have any other symptoms? \" (e.g., shaking chills, weakness, rash elsewhere on body)      *No Answer*  10. PREGNANCY: \"Is there any chance you are pregnant? \" \"When was your last menstrual period? \"        *No Answer*    Protocols used: WOUND INFECTION-ADULT-[place]

## 2022-01-11 ENCOUNTER — TELEPHONE (OUTPATIENT)
Dept: FAMILY MEDICINE CLINIC | Age: 52
End: 2022-01-11

## 2022-01-11 DIAGNOSIS — R92.8 ABNORMAL MAMMOGRAM: Primary | ICD-10-CM

## 2022-01-11 NOTE — TELEPHONE ENCOUNTER
----- Message from [facility] sent at 1/11/2022 10:48 AM EST -----  Subject: Referral Request    QUESTIONS   Reason for referral request? follow up mammogram   Has the physician seen you for this condition before? Yes  Select a date? 2021-07-09  Select the Provider the patient wants to be referred to, if known (PCP or   Specialist)? Outside [facility]   Preferred Specialist (if applicable)? Do you already have an appointment scheduled? No  Additional Information for Provider? Pt needs to have a follow up   mammogram and they told her she needs a referral  ---------------------------------------------------------------------------  --------------  CALL BACK INFO  What is the best way for the office to contact you? OK to leave message on   voicemail  Preferred Call Back Phone Number?  [phone]

## 2022-05-11 RX ORDER — LEVOTHYROXINE SODIUM 0.07 MG/1
75 TABLET ORAL DAILY
Qty: 90 TABLET | Refills: 0 | Status: SHIPPED | OUTPATIENT
Start: 2022-05-11

## 2022-06-06 ENCOUNTER — OFFICE VISIT (OUTPATIENT)
Dept: FAMILY MEDICINE CLINIC | Age: 52
End: 2022-06-06
Payer: MEDICARE

## 2022-06-06 VITALS
WEIGHT: 176 LBS | SYSTOLIC BLOOD PRESSURE: 124 MMHG | BODY MASS INDEX: 30.05 KG/M2 | HEART RATE: 84 BPM | HEIGHT: 64 IN | DIASTOLIC BLOOD PRESSURE: 82 MMHG | TEMPERATURE: 98.1 F

## 2022-06-06 DIAGNOSIS — Z12.11 COLON CANCER SCREENING: ICD-10-CM

## 2022-06-06 DIAGNOSIS — G81.91 RIGHT HEMIPARESIS (HCC): ICD-10-CM

## 2022-06-06 DIAGNOSIS — E03.9 ACQUIRED HYPOTHYROIDISM: Primary | ICD-10-CM

## 2022-06-06 DIAGNOSIS — R20.0 NUMBNESS IN FEET: ICD-10-CM

## 2022-06-06 PROCEDURE — 1036F TOBACCO NON-USER: CPT | Performed by: FAMILY MEDICINE

## 2022-06-06 PROCEDURE — 99214 OFFICE O/P EST MOD 30 MIN: CPT | Performed by: FAMILY MEDICINE

## 2022-06-06 PROCEDURE — 3017F COLORECTAL CA SCREEN DOC REV: CPT | Performed by: FAMILY MEDICINE

## 2022-06-06 PROCEDURE — G8427 DOCREV CUR MEDS BY ELIG CLIN: HCPCS | Performed by: FAMILY MEDICINE

## 2022-06-06 PROCEDURE — G8417 CALC BMI ABV UP PARAM F/U: HCPCS | Performed by: FAMILY MEDICINE

## 2022-06-06 SDOH — ECONOMIC STABILITY: FOOD INSECURITY: WITHIN THE PAST 12 MONTHS, YOU WORRIED THAT YOUR FOOD WOULD RUN OUT BEFORE YOU GOT MONEY TO BUY MORE.: NEVER TRUE

## 2022-06-06 SDOH — ECONOMIC STABILITY: FOOD INSECURITY: WITHIN THE PAST 12 MONTHS, THE FOOD YOU BOUGHT JUST DIDN'T LAST AND YOU DIDN'T HAVE MONEY TO GET MORE.: NEVER TRUE

## 2022-06-06 SDOH — ECONOMIC STABILITY: TRANSPORTATION INSECURITY
IN THE PAST 12 MONTHS, HAS LACK OF TRANSPORTATION KEPT YOU FROM MEETINGS, WORK, OR FROM GETTING THINGS NEEDED FOR DAILY LIVING?: NO

## 2022-06-06 SDOH — ECONOMIC STABILITY: TRANSPORTATION INSECURITY
IN THE PAST 12 MONTHS, HAS THE LACK OF TRANSPORTATION KEPT YOU FROM MEDICAL APPOINTMENTS OR FROM GETTING MEDICATIONS?: NO

## 2022-06-06 ASSESSMENT — PATIENT HEALTH QUESTIONNAIRE - PHQ9
SUM OF ALL RESPONSES TO PHQ QUESTIONS 1-9: 0
6. FEELING BAD ABOUT YOURSELF - OR THAT YOU ARE A FAILURE OR HAVE LET YOURSELF OR YOUR FAMILY DOWN: 0
8. MOVING OR SPEAKING SO SLOWLY THAT OTHER PEOPLE COULD HAVE NOTICED. OR THE OPPOSITE, BEING SO FIGETY OR RESTLESS THAT YOU HAVE BEEN MOVING AROUND A LOT MORE THAN USUAL: 0
SUM OF ALL RESPONSES TO PHQ QUESTIONS 1-9: 0
3. TROUBLE FALLING OR STAYING ASLEEP: 0
9. THOUGHTS THAT YOU WOULD BE BETTER OFF DEAD, OR OF HURTING YOURSELF: 0
SUM OF ALL RESPONSES TO PHQ QUESTIONS 1-9: 0
SUM OF ALL RESPONSES TO PHQ QUESTIONS 1-9: 0
2. FEELING DOWN, DEPRESSED OR HOPELESS: 0
1. LITTLE INTEREST OR PLEASURE IN DOING THINGS: 0
10. IF YOU CHECKED OFF ANY PROBLEMS, HOW DIFFICULT HAVE THESE PROBLEMS MADE IT FOR YOU TO DO YOUR WORK, TAKE CARE OF THINGS AT HOME, OR GET ALONG WITH OTHER PEOPLE: 0
7. TROUBLE CONCENTRATING ON THINGS, SUCH AS READING THE NEWSPAPER OR WATCHING TELEVISION: 0
SUM OF ALL RESPONSES TO PHQ9 QUESTIONS 1 & 2: 0
5. POOR APPETITE OR OVEREATING: 0
4. FEELING TIRED OR HAVING LITTLE ENERGY: 0

## 2022-06-06 ASSESSMENT — SOCIAL DETERMINANTS OF HEALTH (SDOH): HOW HARD IS IT FOR YOU TO PAY FOR THE VERY BASICS LIKE FOOD, HOUSING, MEDICAL CARE, AND HEATING?: NOT HARD AT ALL

## 2022-06-06 NOTE — PATIENT INSTRUCTIONS
Do the additional tests   Remember to get the mammogram studies done  Continue the medicine  See dr Anitra Washington for the colon check   See in about 5 months
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
## 2022-06-08 DIAGNOSIS — R20.0 NUMBNESS IN FEET: ICD-10-CM

## 2022-06-08 DIAGNOSIS — E03.9 ACQUIRED HYPOTHYROIDISM: ICD-10-CM

## 2022-06-08 LAB
A/G RATIO: 1.8 (ref 1.1–2.2)
ALBUMIN SERPL-MCNC: 4.7 G/DL (ref 3.4–5)
ALP BLD-CCNC: 106 U/L (ref 40–129)
ALT SERPL-CCNC: 19 U/L (ref 10–40)
ANION GAP SERPL CALCULATED.3IONS-SCNC: 14 MMOL/L (ref 3–16)
AST SERPL-CCNC: 20 U/L (ref 15–37)
BASOPHILS ABSOLUTE: 0.1 K/UL (ref 0–0.2)
BASOPHILS RELATIVE PERCENT: 1.1 %
BILIRUB SERPL-MCNC: 0.3 MG/DL (ref 0–1)
BUN BLDV-MCNC: 10 MG/DL (ref 7–20)
CALCIUM SERPL-MCNC: 9.5 MG/DL (ref 8.3–10.6)
CHLORIDE BLD-SCNC: 104 MMOL/L (ref 99–110)
CO2: 24 MMOL/L (ref 21–32)
CREAT SERPL-MCNC: 0.6 MG/DL (ref 0.6–1.1)
EOSINOPHILS ABSOLUTE: 0 K/UL (ref 0–0.6)
EOSINOPHILS RELATIVE PERCENT: 0.7 %
FOLATE: 11.35 NG/ML (ref 4.78–24.2)
GFR AFRICAN AMERICAN: >60
GFR NON-AFRICAN AMERICAN: >60
GLUCOSE BLD-MCNC: 97 MG/DL (ref 70–99)
HCT VFR BLD CALC: 40.6 % (ref 36–48)
HEMOGLOBIN: 13.2 G/DL (ref 12–16)
LYMPHOCYTES ABSOLUTE: 1.4 K/UL (ref 1–5.1)
LYMPHOCYTES RELATIVE PERCENT: 22 %
MCH RBC QN AUTO: 30.9 PG (ref 26–34)
MCHC RBC AUTO-ENTMCNC: 32.5 G/DL (ref 31–36)
MCV RBC AUTO: 95.1 FL (ref 80–100)
MONOCYTES ABSOLUTE: 0.5 K/UL (ref 0–1.3)
MONOCYTES RELATIVE PERCENT: 8 %
NEUTROPHILS ABSOLUTE: 4.4 K/UL (ref 1.7–7.7)
NEUTROPHILS RELATIVE PERCENT: 68.2 %
PDW BLD-RTO: 15 % (ref 12.4–15.4)
PLATELET # BLD: 280 K/UL (ref 135–450)
PMV BLD AUTO: 10.4 FL (ref 5–10.5)
POTASSIUM SERPL-SCNC: 5.1 MMOL/L (ref 3.5–5.1)
RBC # BLD: 4.27 M/UL (ref 4–5.2)
SODIUM BLD-SCNC: 142 MMOL/L (ref 136–145)
TOTAL PROTEIN: 7.3 G/DL (ref 6.4–8.2)
TSH SERPL DL<=0.05 MIU/L-ACNC: 1.9 UIU/ML (ref 0.27–4.2)
VITAMIN B-12: 238 PG/ML (ref 211–911)
WBC # BLD: 6.4 K/UL (ref 4–11)

## 2022-06-13 ENCOUNTER — HOSPITAL ENCOUNTER (OUTPATIENT)
Dept: ULTRASOUND IMAGING | Age: 52
Discharge: HOME | End: 2022-06-13
Payer: MEDICARE

## 2022-06-13 ENCOUNTER — HOSPITAL ENCOUNTER (OUTPATIENT)
Dept: WOMENS IMAGING | Age: 52
Discharge: HOME | End: 2022-06-13
Payer: MEDICARE

## 2022-06-13 DIAGNOSIS — R92.8 ABNORMAL MAMMOGRAM: ICD-10-CM

## 2022-06-13 PROCEDURE — 77066 DX MAMMO INCL CAD BI: CPT

## 2022-06-13 PROCEDURE — 76642 ULTRASOUND BREAST LIMITED: CPT

## 2022-06-16 ENCOUNTER — HOSPITAL ENCOUNTER (OUTPATIENT)
Dept: WOMENS IMAGING | Age: 52
Discharge: HOME | End: 2022-06-16
Payer: MEDICARE

## 2022-06-16 PROCEDURE — G0279 TOMOSYNTHESIS, MAMMO: HCPCS

## 2022-07-05 ENCOUNTER — TELEPHONE (OUTPATIENT)
Dept: FAMILY MEDICINE CLINIC | Age: 52
End: 2022-07-05

## 2022-07-05 NOTE — TELEPHONE ENCOUNTER
----- Message from Elmer Rodrigues sent at 7/5/2022  9:26 AM EDT -----  Subject: Message to Provider    QUESTIONS  Information for Provider? Patient is requesting a script for a handicap   placard. She has never had a placard, but feels she needs one. She would   like the script mailed to her address on file. Please advise. Thank you.  ---------------------------------------------------------------------------  --------------  Fidelina MEANS  [phone]; OK to leave message on voicemail  ---------------------------------------------------------------------------  --------------  SCRIPT ANSWERS  Relationship to Patient?  Self

## 2022-07-05 NOTE — LETTER
[facility]  [address] [facility] [address]  Phone: [phone]  Fax: [phone]    Richard Hensley MD         July 7, 2022     Patient: Kory Dunn   YOB: 1970   Date of Visit: 7/5/2022       To Whom It May Concern: It is my medical opinion that Miriam Wolf requires a disability parking placard for the following reasons:  She cannot walk without assistance from another person or the use of an assistance device (cane, crutch, prosthetic device, wheelchair, etc.). She cannot walk 200 feet without stopping to rest.  She has limited walking ability due to an orthopedic condition. Duration of need: 2 years  (expires 7-)     If you have any questions or concerns, please don't hesitate to call.     Sincerely,        Richard Hensley MD

## 2022-07-06 NOTE — TELEPHONE ENCOUNTER
She needs to do the mammogram follow up  We have called her on this   Does she want to follow the abnormal finding or not?

## 2022-10-28 ENCOUNTER — PROCEDURE VISIT (OUTPATIENT)
Dept: NEUROLOGY | Age: 52
End: 2022-10-28
Payer: MEDICARE

## 2022-10-28 DIAGNOSIS — R20.0 RIGHT LEG NUMBNESS: Primary | ICD-10-CM

## 2022-10-28 PROCEDURE — 95886 MUSC TEST DONE W/N TEST COMP: CPT | Performed by: PSYCHIATRY & NEUROLOGY

## 2022-10-28 PROCEDURE — 95908 NRV CNDJ TST 3-4 STUDIES: CPT | Performed by: PSYCHIATRY & NEUROLOGY
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
## 2022-11-14 RX ORDER — LEVOTHYROXINE SODIUM 0.07 MG/1
75 TABLET ORAL DAILY
Qty: 90 TABLET | Refills: 1 | Status: SHIPPED | OUTPATIENT
Start: 2022-11-14

## 2022-12-06 ENCOUNTER — OFFICE VISIT (OUTPATIENT)
Dept: FAMILY MEDICINE CLINIC | Age: 52
End: 2022-12-06
Payer: MEDICARE

## 2022-12-06 VITALS
WEIGHT: 180 LBS | BODY MASS INDEX: 30.73 KG/M2 | SYSTOLIC BLOOD PRESSURE: 128 MMHG | HEIGHT: 64 IN | DIASTOLIC BLOOD PRESSURE: 82 MMHG | TEMPERATURE: 97.9 F

## 2022-12-06 DIAGNOSIS — Z23 NEEDS FLU SHOT: ICD-10-CM

## 2022-12-06 DIAGNOSIS — G81.91 RIGHT HEMIPARESIS (HCC): Primary | ICD-10-CM

## 2022-12-06 DIAGNOSIS — M24.541 CONTRACTURE, RIGHT HAND: ICD-10-CM

## 2022-12-06 PROCEDURE — G8482 FLU IMMUNIZE ORDER/ADMIN: HCPCS | Performed by: FAMILY MEDICINE

## 2022-12-06 PROCEDURE — 3017F COLORECTAL CA SCREEN DOC REV: CPT | Performed by: FAMILY MEDICINE

## 2022-12-06 PROCEDURE — G8427 DOCREV CUR MEDS BY ELIG CLIN: HCPCS | Performed by: FAMILY MEDICINE

## 2022-12-06 PROCEDURE — G0008 ADMIN INFLUENZA VIRUS VAC: HCPCS | Performed by: FAMILY MEDICINE

## 2022-12-06 PROCEDURE — 99213 OFFICE O/P EST LOW 20 MIN: CPT | Performed by: FAMILY MEDICINE

## 2022-12-06 PROCEDURE — 90674 CCIIV4 VAC NO PRSV 0.5 ML IM: CPT | Performed by: FAMILY MEDICINE

## 2022-12-06 PROCEDURE — 1036F TOBACCO NON-USER: CPT | Performed by: FAMILY MEDICINE

## 2022-12-06 PROCEDURE — G8417 CALC BMI ABV UP PARAM F/U: HCPCS | Performed by: FAMILY MEDICINE

## 2022-12-06 NOTE — PROGRESS NOTES
Vaccine Information Sheet, \"Influenza - Inactivated\"  given to Delilah Esteves, or parent/legal guardian of  Delilah Esteves and verbalized understanding. Patient responses:    Have you ever had a reaction to a flu vaccine? No  Do you have any current illness? No  Have you ever had Guillian Collinston Syndrome? No  Do you have a serious allergy to any of the follow: Neomycin, Polymyxin, Thimerosal, eggs or egg products? No    Flu vaccine given per order. Please see immunization tab. Risks and benefits explained. Current VIS given.

## 2022-12-06 NOTE — PATIENT INSTRUCTIONS
Use a multivitamin once daily  See the hand surgeon for opinion on the hand and anything that can be done at this point in time to avoid further contractures  You might consider the shingle vaccine

## 2022-12-06 NOTE — PROGRESS NOTES
Subjective:      Patient ID: Tellis Merlin is a 47 y.o. female. Chief Complaint   Patient presents with    6 Month Follow-Up     Thyroid,         Patient presents with:  6 Month Follow-Up: Thyroid,     She is here for the above  She is well no c/o    She wanted to know about seeing some one for opinion on her hands and any therapy that might be useful to avoid further contractures    YOB: 1970    Date of Visit:  12/6/2022     -- Cephalexin    -- Pcn [Penicillins]     Current Outpatient Medications:  levothyroxine (SYNTHROID) 75 MCG tablet, TAKE 1 TABLET BY MOUTH DAILY, Disp: 90 tablet, Rfl: 1  Multiple Vitamins-Minerals (THERAPEUTIC MULTIVITAMIN-MINERALS) tablet, Take 1 tablet by mouth daily, Disp: , Rfl:   sennosides-docusate sodium (SENOKOT-S) 8.6-50 MG tablet, Take 1 tablet by mouth daily, Disp: , Rfl:     No current facility-administered medications for this visit.      ---------------------------               12/06/22                      1514         ---------------------------   BP:          128/82         Site:    Left Upper Arm     Position:     Sitting        Cuff Size:   Large Adult      Temp:   97.9 °F (36.6 °C)   TempSrc:    Temporal        Weight: 180 lb (81.6 kg)    Height:  5' 4\" (1.626 m)   ---------------------------  Body mass index is 30.9 kg/m². Wt Readings from Last 3 Encounters:  12/06/22 : 180 lb (81.6 kg)  06/06/22 : 176 lb (79.8 kg)  04/16/21 : 170 lb (77.1 kg)    BP Readings from Last 3 Encounters:  12/06/22 : 128/82  06/06/22 : 124/82  04/16/21 : 124/78          Review of Systems    Objective:   Physical Exam  Constitutional:       General: She is not in acute distress. Appearance: Normal appearance. She is not ill-appearing. Musculoskeletal:      Comments: Contracture deformity of the right hand but supple and able to move reasonably well passively   Skin:     General: Skin is warm and dry.       Coloration: Skin is not pale.   Neurological:      Mental Status: She is alert. Assessment:       Diagnosis Orders   1. Right hemiparesis ([address]        2. Contracture, right hand  Tran Franco MD, Hand Surgery (Hand, Wrist, Upper Extremity), [facility]      3.  Needs flu shot  Influenza, FLUCELVAX, (age 10 mo+), IM, PF, 0.5 mL          She does not use the mvi regular  We discussed her last labs  She has no pb with flu shot and wanted same       Plan:      Use a multivitamin once daily  See the hand surgeon for opinion on the hand and anything that can be done at this point in time to avoid further contractures  You might consider the shingle vaccine           Dickson Hall MD

## 2023-02-16 RX ORDER — LEVOTHYROXINE SODIUM 0.07 MG/1
75 TABLET ORAL DAILY
Qty: 90 TABLET | Refills: 1 | Status: SHIPPED | OUTPATIENT
Start: 2023-02-16

## 2023-06-16 DIAGNOSIS — E03.9 ACQUIRED HYPOTHYROIDISM: ICD-10-CM

## 2023-06-16 LAB — TSH SERPL DL<=0.005 MIU/L-ACNC: 2.89 UIU/ML (ref 0.27–4.2)

## 2023-07-31 ENCOUNTER — TELEPHONE (OUTPATIENT)
Dept: FAMILY MEDICINE CLINIC | Age: 53
End: 2023-07-31

## 2023-08-18 RX ORDER — LEVOTHYROXINE SODIUM 0.07 MG/1
75 TABLET ORAL DAILY
Qty: 90 TABLET | Refills: 1 | Status: SHIPPED | OUTPATIENT
Start: 2023-08-18

## 2023-10-06 ENCOUNTER — HOSPITAL ENCOUNTER (OUTPATIENT)
Dept: WOMENS IMAGING | Age: 53
Discharge: HOME | End: 2023-10-06
Attending: FAMILY MEDICINE
Payer: MEDICARE

## 2023-10-06 VITALS — BODY MASS INDEX: 32.44 KG/M2 | HEIGHT: 64 IN | WEIGHT: 190 LBS

## 2023-10-06 DIAGNOSIS — Z12.31 BREAST CANCER SCREENING BY MAMMOGRAM: ICD-10-CM

## 2023-10-06 PROCEDURE — 77067 SCR MAMMO BI INCL CAD: CPT

## 2023-10-16 ENCOUNTER — TELEPHONE (OUTPATIENT)
Dept: FAMILY MEDICINE CLINIC | Age: 53
End: 2023-10-16

## 2024-01-22 ENCOUNTER — TELEPHONE (OUTPATIENT)
Dept: FAMILY MEDICINE CLINIC | Age: 54
End: 2024-01-22

## 2024-02-14 RX ORDER — LEVOTHYROXINE SODIUM 0.07 MG/1
75 TABLET ORAL DAILY
Qty: 90 TABLET | Refills: 1 | Status: SHIPPED | OUTPATIENT
Start: 2024-02-14

## 2024-03-08 ENCOUNTER — OFFICE VISIT (OUTPATIENT)
Dept: FAMILY MEDICINE CLINIC | Age: 54
End: 2024-03-08

## 2024-03-08 VITALS
BODY MASS INDEX: 28.17 KG/M2 | WEIGHT: 165 LBS | DIASTOLIC BLOOD PRESSURE: 74 MMHG | HEIGHT: 64 IN | TEMPERATURE: 97.3 F | SYSTOLIC BLOOD PRESSURE: 120 MMHG | HEART RATE: 84 BPM

## 2024-03-08 DIAGNOSIS — G81.91 RIGHT HEMIPARESIS (HCC): ICD-10-CM

## 2024-03-08 DIAGNOSIS — Z86.2 HISTORY OF ANEMIA: ICD-10-CM

## 2024-03-08 DIAGNOSIS — Z00.00 INITIAL MEDICARE ANNUAL WELLNESS VISIT: Primary | ICD-10-CM

## 2024-03-08 DIAGNOSIS — Z12.11 COLON CANCER SCREENING: ICD-10-CM

## 2024-03-08 DIAGNOSIS — E03.9 ACQUIRED HYPOTHYROIDISM: ICD-10-CM

## 2024-03-08 DIAGNOSIS — R63.4 WEIGHT LOSS: ICD-10-CM

## 2024-03-08 DIAGNOSIS — E03.9 ACQUIRED HYPOTHYROIDISM: Primary | ICD-10-CM

## 2024-03-08 LAB
ALBUMIN SERPL-MCNC: 4.8 G/DL (ref 3.4–5)
ALBUMIN/GLOB SERPL: 1.9 {RATIO} (ref 1.1–2.2)
ALP SERPL-CCNC: 124 U/L (ref 40–129)
ALT SERPL-CCNC: 25 U/L (ref 10–40)
ANION GAP SERPL CALCULATED.3IONS-SCNC: 9 MMOL/L (ref 3–16)
AST SERPL-CCNC: 23 U/L (ref 15–37)
BASOPHILS # BLD: 0 K/UL (ref 0–0.2)
BASOPHILS NFR BLD: 0.4 %
BILIRUB SERPL-MCNC: 0.3 MG/DL (ref 0–1)
BUN SERPL-MCNC: 15 MG/DL (ref 7–20)
CALCIUM SERPL-MCNC: 9.8 MG/DL (ref 8.3–10.6)
CHLORIDE SERPL-SCNC: 106 MMOL/L (ref 99–110)
CO2 SERPL-SCNC: 28 MMOL/L (ref 21–32)
CREAT SERPL-MCNC: 0.8 MG/DL (ref 0.6–1.1)
DEPRECATED RDW RBC AUTO: 12.4 % (ref 12.4–15.4)
EOSINOPHIL # BLD: 0.1 K/UL (ref 0–0.6)
EOSINOPHIL NFR BLD: 1 %
FOLATE SERPL-MCNC: >20 NG/ML (ref 4.78–24.2)
GFR SERPLBLD CREATININE-BSD FMLA CKD-EPI: >60 ML/MIN/{1.73_M2}
GLUCOSE SERPL-MCNC: 88 MG/DL (ref 70–99)
HCT VFR BLD AUTO: 42.6 % (ref 36–48)
HGB BLD-MCNC: 14.2 G/DL (ref 12–16)
LYMPHOCYTES # BLD: 2.1 K/UL (ref 1–5.1)
LYMPHOCYTES NFR BLD: 26.7 %
MCH RBC QN AUTO: 32.4 PG (ref 26–34)
MCHC RBC AUTO-ENTMCNC: 33.4 G/DL (ref 31–36)
MCV RBC AUTO: 96.9 FL (ref 80–100)
MONOCYTES # BLD: 0.6 K/UL (ref 0–1.3)
MONOCYTES NFR BLD: 7.1 %
NEUTROPHILS # BLD: 5 K/UL (ref 1.7–7.7)
NEUTROPHILS NFR BLD: 64.8 %
PLATELET # BLD AUTO: 264 K/UL (ref 135–450)
PLATELET BLD QL SMEAR: ADEQUATE
PMV BLD AUTO: 11.3 FL (ref 5–10.5)
POTASSIUM SERPL-SCNC: 5.5 MMOL/L (ref 3.5–5.1)
PROT SERPL-MCNC: 7.3 G/DL (ref 6.4–8.2)
RBC # BLD AUTO: 4.4 M/UL (ref 4–5.2)
SLIDE REVIEW: ABNORMAL
SODIUM SERPL-SCNC: 143 MMOL/L (ref 136–145)
T4 FREE SERPL-MCNC: 1.5 NG/DL (ref 0.9–1.8)
TSH SERPL DL<=0.005 MIU/L-ACNC: 2.54 UIU/ML (ref 0.27–4.2)
VIT B12 SERPL-MCNC: 483 PG/ML (ref 211–911)
WBC # BLD AUTO: 7.8 K/UL (ref 4–11)

## 2024-03-08 ASSESSMENT — ENCOUNTER SYMPTOMS
NAUSEA: 0
CONSTIPATION: 0
VOMITING: 0
ABDOMINAL DISTENTION: 0
CHEST TIGHTNESS: 0
ABDOMINAL PAIN: 0
DIARRHEA: 0
COUGH: 0
BLOOD IN STOOL: 0
SHORTNESS OF BREATH: 0

## 2024-03-08 ASSESSMENT — PATIENT HEALTH QUESTIONNAIRE - PHQ9
SUM OF ALL RESPONSES TO PHQ9 QUESTIONS 1 & 2: 0
SUM OF ALL RESPONSES TO PHQ QUESTIONS 1-9: 0
SUM OF ALL RESPONSES TO PHQ QUESTIONS 1-9: 0
1. LITTLE INTEREST OR PLEASURE IN DOING THINGS: 0
SUM OF ALL RESPONSES TO PHQ QUESTIONS 1-9: 0
5. POOR APPETITE OR OVEREATING: 0
2. FEELING DOWN, DEPRESSED OR HOPELESS: 0
6. FEELING BAD ABOUT YOURSELF - OR THAT YOU ARE A FAILURE OR HAVE LET YOURSELF OR YOUR FAMILY DOWN: 0
8. MOVING OR SPEAKING SO SLOWLY THAT OTHER PEOPLE COULD HAVE NOTICED. OR THE OPPOSITE, BEING SO FIGETY OR RESTLESS THAT YOU HAVE BEEN MOVING AROUND A LOT MORE THAN USUAL: 0
9. THOUGHTS THAT YOU WOULD BE BETTER OFF DEAD, OR OF HURTING YOURSELF: 0
SUM OF ALL RESPONSES TO PHQ QUESTIONS 1-9: 0
4. FEELING TIRED OR HAVING LITTLE ENERGY: 0
10. IF YOU CHECKED OFF ANY PROBLEMS, HOW DIFFICULT HAVE THESE PROBLEMS MADE IT FOR YOU TO DO YOUR WORK, TAKE CARE OF THINGS AT HOME, OR GET ALONG WITH OTHER PEOPLE: 0
7. TROUBLE CONCENTRATING ON THINGS, SUCH AS READING THE NEWSPAPER OR WATCHING TELEVISION: 0
3. TROUBLE FALLING OR STAYING ASLEEP: 0

## 2024-03-08 ASSESSMENT — LIFESTYLE VARIABLES
HOW OFTEN DO YOU HAVE A DRINK CONTAINING ALCOHOL: NEVER
HOW MANY STANDARD DRINKS CONTAINING ALCOHOL DO YOU HAVE ON A TYPICAL DAY: PATIENT DOES NOT DRINK

## 2024-03-08 NOTE — PATIENT INSTRUCTIONS
Consider the shingle vaccine   Do pursue the colon check   See dr basilio for the colon check or one her associates   See back in October

## 2024-03-08 NOTE — PROGRESS NOTES
Subjective:      Patient ID: Erin Golden is a 53 y.o. female.    Chief Complaint   Patient presents with    Check-Up     Thyroid, lipids         Patient presents with:  Check-Up: Thyroid, lipids     Here for the above   She is well   Here with aunt butch  She is well   She has no c/o  She is trying to lose weight   She is on diet  She walks regular now     YOB: 1970    Date of Visit:  3/8/2024     -- Cephalexin    -- Pcn [Penicillins]     Current Outpatient Medications:  levothyroxine (SYNTHROID) 75 MCG tablet, TAKE 1 TABLET BY MOUTH DAILY, Disp: 90 tablet, Rfl: 1  Multiple Vitamins-Minerals (THERAPEUTIC MULTIVITAMIN-MINERALS) tablet, Take 1 tablet by mouth daily, Disp: , Rfl:   sennosides-docusate sodium (SENOKOT-S) 8.6-50 MG tablet, Take 1 tablet by mouth daily, Disp: , Rfl:     No current facility-administered medications for this visit.      ---------------------------               03/08/24                      1436         ---------------------------   BP:          120/74         Site:    Left Upper Arm     Position:     Sitting        Cuff Size:  Medium Adult      Pulse:         84           Temp:   97.3 °F (36.3 °C)   TempSrc:    Temporal        Weight: 74.8 kg (165 lb)    Height:  1.626 m (5' 4\")   ---------------------------  Body mass index is 28.32 kg/m².     Wt Readings from Last 3 Encounters:  03/08/24 : 74.8 kg (165 lb)  10/06/23 : 86.2 kg (190 lb)  06/16/23 : 89.4 kg (197 lb)    BP Readings from Last 3 Encounters:  03/08/24 : 120/74  06/16/23 : 122/78  12/06/22 : 128/82            Review of Systems   Constitutional:  Negative for appetite change, chills, fever and unexpected weight change.   Respiratory:  Negative for cough, chest tightness and shortness of breath.    Cardiovascular:  Negative for chest pain, palpitations and leg swelling.   Gastrointestinal:  Negative for abdominal distention, abdominal pain, blood in stool, constipation,

## 2024-03-08 NOTE — PROGRESS NOTES
Medicare Annual Wellness Visit    Erin Golden is here for Medicare AWV    Assessment & Plan   Initial Medicare annual wellness visit  Recommendations for Preventive Services Due: see orders and patient instructions/AVS.  Recommended screening schedule for the next 5-10 years is provided to the patient in written form: see Patient Instructions/AVS.     Return in 1 year (on 3/8/2025).     Subjective   Medicare AWV    Patient's complete Health Risk Assessment and screening values have been reviewed and are found in Flowsheets. The following problems were reviewed today and where indicated follow up appointments were made and/or referrals ordered.    Positive Risk Factor Screenings with Interventions:                 Dentist Screen:  Have you seen the dentist within the past year?: (!) No (Patient will schedule)    Intervention:  See AVS for additional education material     Vision Screen:  Do you have difficulty driving, watching TV, or doing any of your daily activities because of your eyesight?: (!) Yes (Needs new prescription)  Have you had an eye exam within the past year?: Yes  No results found.    Interventions:   See AVS for additional education material     ADL's:   Patient reports needing help with:  Select all that apply: (!) Shopping, Transportation  Interventions:  See AVS for additional education material                  Objective   There were no vitals filed for this visit.   There is no height or weight on file to calculate BMI.               Allergies   Allergen Reactions    Cephalexin     Pcn [Penicillins]      Prior to Visit Medications    Medication Sig Taking? Authorizing Provider   levothyroxine (SYNTHROID) 75 MCG tablet TAKE 1 TABLET BY MOUTH DAILY Yes aJyla Luis, APRN - NP   Multiple Vitamins-Minerals (THERAPEUTIC MULTIVITAMIN-MINERALS) tablet Take 1 tablet by mouth daily Yes Provider, MD Liz   sennosides-docusate sodium (SENOKOT-S) 8.6-50 MG tablet Take 1 tablet by mouth daily

## 2024-03-11 DIAGNOSIS — E87.5 HIGH POTASSIUM: Primary | ICD-10-CM

## 2024-03-16 DIAGNOSIS — R63.4 WEIGHT LOSS: ICD-10-CM

## 2024-03-16 DIAGNOSIS — E87.5 HIGH POTASSIUM: ICD-10-CM

## 2024-03-16 LAB
BACTERIA URNS QL MICRO: ABNORMAL /HPF
BILIRUB UR QL STRIP.AUTO: NEGATIVE
CLARITY UR: ABNORMAL
COLOR UR: ABNORMAL
EPI CELLS #/AREA URNS AUTO: 6 /HPF (ref 0–5)
GLUCOSE UR STRIP.AUTO-MCNC: NEGATIVE MG/DL
HGB UR QL STRIP.AUTO: NEGATIVE
HYALINE CASTS #/AREA URNS AUTO: 0 /LPF (ref 0–8)
KETONES UR STRIP.AUTO-MCNC: 15 MG/DL
LEUKOCYTE ESTERASE UR QL STRIP.AUTO: ABNORMAL
NITRITE UR QL STRIP.AUTO: NEGATIVE
PH UR STRIP.AUTO: 6.5 [PH] (ref 5–8)
POTASSIUM SERPL-SCNC: 4.2 MMOL/L (ref 3.5–5.1)
PROT UR STRIP.AUTO-MCNC: ABNORMAL MG/DL
RBC CLUMPS #/AREA URNS AUTO: 2 /HPF (ref 0–4)
SP GR UR STRIP.AUTO: 1.02 (ref 1–1.03)
UA DIPSTICK W REFLEX MICRO PNL UR: YES
URN SPEC COLLECT METH UR: ABNORMAL
UROBILINOGEN UR STRIP-ACNC: 1 E.U./DL
WBC #/AREA URNS AUTO: 4 /HPF (ref 0–5)

## 2024-08-12 RX ORDER — LEVOTHYROXINE SODIUM 0.07 MG/1
75 TABLET ORAL DAILY
Qty: 90 TABLET | Refills: 1 | Status: SHIPPED | OUTPATIENT
Start: 2024-08-12

## 2024-09-10 ENCOUNTER — OFFICE VISIT (OUTPATIENT)
Dept: FAMILY MEDICINE CLINIC | Age: 54
End: 2024-09-10

## 2024-09-10 VITALS
HEIGHT: 64 IN | BODY MASS INDEX: 28 KG/M2 | WEIGHT: 164 LBS | DIASTOLIC BLOOD PRESSURE: 78 MMHG | SYSTOLIC BLOOD PRESSURE: 118 MMHG | HEART RATE: 88 BPM | TEMPERATURE: 97.2 F

## 2024-09-10 DIAGNOSIS — Z12.31 BREAST CANCER SCREENING BY MAMMOGRAM: ICD-10-CM

## 2024-09-10 DIAGNOSIS — F32.9 REACTIVE DEPRESSION: Primary | ICD-10-CM

## 2024-09-10 PROBLEM — S06.6X9A SUBARACHNOID HEMORRHAGE FOLLOWING INJURY WITHOUT OPEN INTRACRANIAL WOUND AND WITH LOSS OF CONSCIOUSNESS (HCC): Status: RESOLVED | Noted: 2017-03-13 | Resolved: 2024-09-10

## 2024-09-10 RX ORDER — ESCITALOPRAM OXALATE 10 MG/1
10 TABLET ORAL DAILY
Qty: 30 TABLET | Refills: 3 | Status: SHIPPED | OUTPATIENT
Start: 2024-09-10

## 2024-09-10 SDOH — ECONOMIC STABILITY: FOOD INSECURITY: WITHIN THE PAST 12 MONTHS, THE FOOD YOU BOUGHT JUST DIDN'T LAST AND YOU DIDN'T HAVE MONEY TO GET MORE.: NEVER TRUE

## 2024-09-10 SDOH — ECONOMIC STABILITY: FOOD INSECURITY: WITHIN THE PAST 12 MONTHS, YOU WORRIED THAT YOUR FOOD WOULD RUN OUT BEFORE YOU GOT MONEY TO BUY MORE.: NEVER TRUE

## 2024-09-10 SDOH — ECONOMIC STABILITY: INCOME INSECURITY: HOW HARD IS IT FOR YOU TO PAY FOR THE VERY BASICS LIKE FOOD, HOUSING, MEDICAL CARE, AND HEATING?: NOT HARD AT ALL

## 2024-10-17 RX ORDER — OMEGA-3S/DHA/EPA/FISH OIL 300-1000MG
CAPSULE ORAL
COMMUNITY

## 2024-10-17 NOTE — PROGRESS NOTES
[facility] ENDOSCOPY COLONOSCOPY PRE-OPERATIVE INSTRUCTIONS    Procedure date_10/25/2024________Arrival time__0830__________        Surgery time___0930_________       Clear liquids the day before the procedure. Do not eat or drink anything within 5 hours of your procedure.    This includes water chewing gum, mints and ice chips.   You may brush your teeth and gargle the morning of your surgery, but do not swallow the water    You may be asked to stop blood thinners such as Coumadin, Plavix, Fragmin, Lovenox, etc., or any anti-inflammatories such as:  Aspirin, Ibuprofen, Advil, Naproxen prior to your procedure.   We also ask that you stop any OTC medications such as fish oil, vitamin E, glucosamine, garlic, Multivitamins, COQ 10, etc.    You must make arrangements for a responsible adult to arrive with you and stay in our waiting area during your procedure.  They will also need to take you home after your procedure.    For your safety you will not be allowed to leave alone or drive yourself home.    Also for your safety, it is strongly suggested that someone stay with you the first 24 hours after your procedure.    For your comfort, please wear simple loose fitting clothing to the center.  Please do not bring valuables.      If you have a living will and a durable power of  for healthcare, please bring in a copy.     You will need to bring a photo ID and insurance card    Our goal is to provide you with excellent care so if you have any questions, please contact us at the [facility] at [phone]         Please note these are generalized instructions for all colonoscopy cases, you may be provided with more specific instructions if necessary

## 2024-10-21 ENCOUNTER — ANESTHESIA EVENT (OUTPATIENT)
Dept: ENDOSCOPY | Age: 54
End: 2024-10-21
Payer: MEDICARE

## 2024-10-25 ENCOUNTER — ANESTHESIA (OUTPATIENT)
Dept: ENDOSCOPY | Age: 54
End: 2024-10-25
Payer: MEDICARE

## 2024-10-25 ENCOUNTER — HOSPITAL ENCOUNTER (OUTPATIENT)
Age: 54
Setting detail: OUTPATIENT SURGERY
Discharge: HOME | End: 2024-10-25
Attending: INTERNAL MEDICINE | Admitting: INTERNAL MEDICINE
Payer: MEDICARE

## 2024-10-25 ENCOUNTER — APPOINTMENT (OUTPATIENT)
Dept: ENDOSCOPY | Age: 54
End: 2024-10-25
Attending: INTERNAL MEDICINE
Payer: MEDICARE

## 2024-10-25 VITALS
HEART RATE: 72 BPM | WEIGHT: 162 LBS | HEIGHT: 64 IN | TEMPERATURE: 97 F | SYSTOLIC BLOOD PRESSURE: 114 MMHG | DIASTOLIC BLOOD PRESSURE: 56 MMHG | BODY MASS INDEX: 27.66 KG/M2 | OXYGEN SATURATION: 97 % | RESPIRATION RATE: 16 BRPM

## 2024-10-25 PROCEDURE — 6360000002 HC RX W HCPCS: Performed by: NURSE ANESTHETIST, CERTIFIED REGISTERED

## 2024-10-25 PROCEDURE — 3700000001 HC ADD 15 MINUTES (ANESTHESIA): Performed by: INTERNAL MEDICINE

## 2024-10-25 PROCEDURE — 7100000010 HC PHASE II RECOVERY - FIRST 15 MIN: Performed by: INTERNAL MEDICINE

## 2024-10-25 PROCEDURE — 3609027000 HC COLONOSCOPY: Performed by: INTERNAL MEDICINE

## 2024-10-25 PROCEDURE — 3700000000 HC ANESTHESIA ATTENDED CARE: Performed by: INTERNAL MEDICINE

## 2024-10-25 PROCEDURE — 7100000011 HC PHASE II RECOVERY - ADDTL 15 MIN: Performed by: INTERNAL MEDICINE

## 2024-10-25 RX ORDER — SODIUM CHLORIDE 9 MG/ML
INJECTION, SOLUTION INTRAVENOUS PRN
Status: CANCELLED | OUTPATIENT
Start: 2024-10-25

## 2024-10-25 RX ORDER — SODIUM CHLORIDE 9 MG/ML
INJECTION, SOLUTION INTRAVENOUS PRN
Status: DISCONTINUED | OUTPATIENT
Start: 2024-10-25 | End: 2024-10-25 | Stop reason: HOSPADM

## 2024-10-25 RX ORDER — SODIUM CHLORIDE 0.9 % (FLUSH) 0.9 %
5-40 SYRINGE (ML) INJECTION PRN
Status: DISCONTINUED | OUTPATIENT
Start: 2024-10-25 | End: 2024-10-25 | Stop reason: HOSPADM

## 2024-10-25 RX ORDER — SODIUM CHLORIDE 0.9 % (FLUSH) 0.9 %
5-40 SYRINGE (ML) INJECTION PRN
Status: CANCELLED | OUTPATIENT
Start: 2024-10-25

## 2024-10-25 RX ORDER — NALOXONE HYDROCHLORIDE 0.4 MG/ML
INJECTION, SOLUTION INTRAMUSCULAR; INTRAVENOUS; SUBCUTANEOUS PRN
Status: CANCELLED | OUTPATIENT
Start: 2024-10-25

## 2024-10-25 RX ORDER — SODIUM CHLORIDE 0.9 % (FLUSH) 0.9 %
5-40 SYRINGE (ML) INJECTION EVERY 12 HOURS SCHEDULED
Status: CANCELLED | OUTPATIENT
Start: 2024-10-25

## 2024-10-25 RX ORDER — SODIUM CHLORIDE 0.9 % (FLUSH) 0.9 %
5-40 SYRINGE (ML) INJECTION EVERY 12 HOURS SCHEDULED
Status: DISCONTINUED | OUTPATIENT
Start: 2024-10-25 | End: 2024-10-25 | Stop reason: HOSPADM

## 2024-10-25 RX ORDER — PROPOFOL 10 MG/ML
INJECTION, EMULSION INTRAVENOUS
Status: DISCONTINUED | OUTPATIENT
Start: 2024-10-25 | End: 2024-10-25 | Stop reason: SDUPTHER

## 2024-10-25 RX ORDER — NALOXONE HYDROCHLORIDE 0.4 MG/ML
INJECTION, SOLUTION INTRAMUSCULAR; INTRAVENOUS; SUBCUTANEOUS PRN
Status: DISCONTINUED | OUTPATIENT
Start: 2024-10-25 | End: 2024-10-25 | Stop reason: HOSPADM

## 2024-10-25 RX ORDER — GLYCOPYRROLATE 0.2 MG/ML
INJECTION INTRAMUSCULAR; INTRAVENOUS
Status: DISCONTINUED | OUTPATIENT
Start: 2024-10-25 | End: 2024-10-25 | Stop reason: SDUPTHER

## 2024-10-25 RX ADMIN — GLYCOPYRROLATE 0.2 MG: 0.2 INJECTION, SOLUTION INTRAMUSCULAR; INTRAVENOUS at 10:24

## 2024-10-25 RX ADMIN — PROPOFOL 100 MG: 10 INJECTION, EMULSION INTRAVENOUS at 10:17

## 2024-10-25 RX ADMIN — PROPOFOL 100 MG: 10 INJECTION, EMULSION INTRAVENOUS at 10:24

## 2024-10-25 ASSESSMENT — PAIN - FUNCTIONAL ASSESSMENT
PAIN_FUNCTIONAL_ASSESSMENT: NONE - DENIES PAIN
PAIN_FUNCTIONAL_ASSESSMENT: 0-10
PAIN_FUNCTIONAL_ASSESSMENT: 0-10
PAIN_FUNCTIONAL_ASSESSMENT: FACE, LEGS, ACTIVITY, CRY, AND CONSOLABILITY (FLACC)

## 2024-10-25 ASSESSMENT — LIFESTYLE VARIABLES: SMOKING_STATUS: 0

## 2024-10-25 NOTE — OP NOTE
COLONOSCOPY     Patient: Erin Golden MRN: [id number]   YOB: 1970 Age: 53 y.o. Sex: female       Admitting Physician: LUIS A MCGUIRE     Primary Care Physician: Kingston Nichole MD      DATE OF PROCEDURE: 10/25/2024  PROCEDURE: Colonoscopy    PREOPERATIVE DIAGNOSIS: Colon cancer screening [Z12.11]  HPI: This is a 53 y.o. year old female who presents today for colon cancer screening and screening colonoscopy    ENDOSCOPIST: Luis A Mcguire MD    POSTOPERATIVE DIAGNOSIS:    1.  Patent otherwise unremarkable colocolonic anastomosis in the mid to left colon        PLAN:   1.  Screening colonoscopy in 10 years    INFORMED CONSENT:  Informed consent for colonoscopy was obtained.  The benefits and risks including adverse medicine reaction and perforation have been explained.  The patient's questions were answered and the patient agreed to proceed.    ASA: ASA 2 - Patient with mild systemic disease with no functional limitations     SEDATION: MAC    The patient's vital signs, cardiac status, pulmonary status, abdominal status and mental status were stable for the procedure. The patient's vital signs and respiratory function as monitored by oxygen saturation remained stable.    COLON PREPARATION:  The patient was given a split colon preparation and the preparation was adequate.    Procedure Details:    An anal exam was performed and this was unremarkable. A digital rectal exam was performed and no masses palpated. The Olympus videocolonoscope  was inserted in the rectum and carefully advanced to the cecum as identified by IC valve, crow's foot appearance and appendix. The cecum was photodocumented.  The colonoscope was slowly withdrawn and retrograde examination of the colon was carefully performed with inspection around and between folds. The ascending colon and cecum were intubated twice with repeat antegrade and retrograde examination.  Retroflexion was performed in the right colon.  Retroflexion in

## 2024-10-25 NOTE — ANESTHESIA POSTPROCEDURE EVALUATION
Department of Anesthesiology  Postprocedure Note    Patient: Erin Golden  MRN: [id number]  YOB: 1970  Date of evaluation: 10/25/2024    Procedure Summary       Date: 10/25/24 Room / Location: [place] [address] / [facility]    Anesthesia Start: 1005 Anesthesia Stop: 1031    Procedure: COLORECTAL CANCER SCREENING, NOT HIGH RISK Diagnosis:       Colon cancer screening      (Colon cancer screening [Z12.11])    Surgeons: Luis A Garcia MD Responsible Provider: Pam Hurley MD    Anesthesia Type: MAC ASA Status: 3            Anesthesia Type: No value filed.    Rainer Phase I: Rainer Score: 10    Rainer Phase II: Rainer Score: 10    Anesthesia Post Evaluation    Patient location during evaluation: bedside  Patient participation: complete - patient participated  Level of consciousness: awake and alert  Pain score: 0  Airway patency: patent  Nausea & Vomiting: no vomiting  Cardiovascular status: blood pressure returned to baseline  Respiratory status: acceptable  Hydration status: euvolemic  Pain management: adequate    No notable events documented.
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
## 2024-10-25 NOTE — ANESTHESIA PRE PROCEDURE
Department of Anesthesiology  Preprocedure Note       Name:  Erin Golden   Age:  53 y.o.  :  1970                                          MRN:  [id number]         Date:  10/25/2024      Surgeon: Surgeon(s):  Luis A Garcia MD    Procedure: Procedure(s):  COLORECTAL CANCER SCREENING, NOT HIGH RISK    Medications prior to admission:   Prior to Admission medications    Medication Sig Start Date End Date Taking? Authorizing Provider   Omega-3 Fatty Acids (OMEGA-3 FISH OIL) 300 MG CAPS Take by mouth    Liz Talbot MD   Apoaequorin (PREVAGEN EXTRA STRENGTH PO) Take by mouth    Liz Talbot MD   escitalopram (LEXAPRO) 10 MG tablet Take 1 tablet by mouth daily 9/10/24   Kingston Nichole MD   levothyroxine (SYNTHROID) 75 MCG tablet TAKE 1 TABLET BY MOUTH DAILY 24   Kingston Nichole MD   Multiple Vitamins-Minerals (THERAPEUTIC MULTIVITAMIN-MINERALS) tablet Take 1 tablet by mouth daily    Liz Talbot MD   sennosides-docusate sodium (SENOKOT-S) 8.6-50 MG tablet Take 1 tablet by mouth daily    Liz Talbot MD       Current medications:    No current facility-administered medications for this encounter.       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

## 2024-11-15 ENCOUNTER — HOSPITAL ENCOUNTER (OUTPATIENT)
Dept: MAMMOGRAPHY | Age: 54
Discharge: HOME | End: 2024-11-20
Payer: MEDICARE

## 2024-11-15 DIAGNOSIS — Z12.31 VISIT FOR SCREENING MAMMOGRAM: ICD-10-CM

## 2024-11-15 PROCEDURE — 77063 BREAST TOMOSYNTHESIS BI: CPT

## 2024-12-09 RX ORDER — ESCITALOPRAM OXALATE 10 MG/1
10 TABLET ORAL DAILY
Qty: 30 TABLET | Refills: 3 | Status: SHIPPED | OUTPATIENT
Start: 2024-12-09

## 2024-12-30 ENCOUNTER — OFFICE VISIT (OUTPATIENT)
Dept: FAMILY MEDICINE CLINIC | Age: 54
End: 2024-12-30

## 2024-12-30 VITALS
HEART RATE: 80 BPM | BODY MASS INDEX: 30.05 KG/M2 | DIASTOLIC BLOOD PRESSURE: 80 MMHG | HEIGHT: 64 IN | TEMPERATURE: 98.1 F | WEIGHT: 176 LBS | SYSTOLIC BLOOD PRESSURE: 118 MMHG

## 2024-12-30 DIAGNOSIS — E03.9 ACQUIRED HYPOTHYROIDISM: ICD-10-CM

## 2024-12-30 DIAGNOSIS — F32.9 REACTIVE DEPRESSION: ICD-10-CM

## 2024-12-30 RX ORDER — ESCITALOPRAM OXALATE 20 MG/1
20 TABLET ORAL DAILY
Qty: 90 TABLET | Refills: 1 | Status: SHIPPED | OUTPATIENT
Start: 2024-12-30

## 2024-12-30 ASSESSMENT — ENCOUNTER SYMPTOMS
ABDOMINAL PAIN: 0
DIARRHEA: 0
CONSTIPATION: 0
SHORTNESS OF BREATH: 0
BLOOD IN STOOL: 0

## 2024-12-30 NOTE — PROGRESS NOTES
Subjective:      Patient ID: Erin Golden is a 54 y.o. female.    Chief Complaint   Patient presents with    3 Month Follow-Up     Thyroid, Depression -- may need increased         Patient presents with:  3 Month Follow-Up: Thyroid, Depression -- may need increased     Here for the above     No c/o  Meds same     Here with her aunt butch    YOB: 1970    Date of Visit:  12/30/2024     -- Cephalexin    -- Pcn [Penicillins]     Current Outpatient Medications:  escitalopram (LEXAPRO) 10 MG tablet, TAKE 1 TABLET BY MOUTH DAILY, Disp: 30 tablet, Rfl: 3  Omega-3 Fatty Acids (OMEGA-3 FISH OIL) 300 MG CAPS, Take by mouth, Disp: , Rfl:   Apoaequorin (PREVAGEN EXTRA STRENGTH PO), Take by mouth, Disp: , Rfl:   levothyroxine (SYNTHROID) 75 MCG tablet, TAKE 1 TABLET BY MOUTH DAILY, Disp: 90 tablet, Rfl: 1  Multiple Vitamins-Minerals (THERAPEUTIC MULTIVITAMIN-MINERALS) tablet, Take 1 tablet by mouth daily, Disp: , Rfl:   sennosides-docusate sodium (SENOKOT-S) 8.6-50 MG tablet, Take 1 tablet by mouth daily, Disp: , Rfl:     No current facility-administered medications for this visit.      ---------------------------               12/30/24                      1356         ---------------------------   BP:          118/80         Site:    Left Upper Arm     Position:     Sitting        Cuff Size:   Large Adult      Pulse:         80           Temp:   98.1 °F (36.7 °C)   TempSrc:    Temporal        Weight: 79.8 kg (176 lb)    Height:  1.626 m (5' 4\")   ---------------------------  Body mass index is 30.21 kg/m².     Wt Readings from Last 3 Encounters:  12/30/24 : 79.8 kg (176 lb)  10/25/24 : 73.5 kg (162 lb)  09/10/24 : 74.4 kg (164 lb)    BP Readings from Last 3 Encounters:  12/30/24 : 118/80  10/25/24 : (!) 114/56  09/10/24 : 118/78            Review of Systems   Constitutional:  Negative for chills and fever.   Respiratory:  Negative for shortness of breath.

## 2024-12-31 LAB
T4 FREE SERPL-MCNC: 1.4 NG/DL (ref 0.9–1.8)
TSH SERPL DL<=0.005 MIU/L-ACNC: 2.44 UIU/ML (ref 0.27–4.2)

## 2025-01-20 RX ORDER — LEVOTHYROXINE SODIUM 75 UG/1
75 TABLET ORAL DAILY
Qty: 90 TABLET | Refills: 1 | Status: SHIPPED | OUTPATIENT
Start: 2025-01-20

## 2025-06-29 PROBLEM — S32.009A FRACTURE OF LUMBAR VERTEBRA (HCC): Status: RESOLVED | Noted: 2017-03-13 | Resolved: 2025-06-29

## 2025-06-29 PROBLEM — S92.009A CLOSED FRACTURE OF CALCANEUS: Status: RESOLVED | Noted: 2017-03-21 | Resolved: 2025-06-29

## 2025-06-29 PROBLEM — S36.039A LACERATION OF SPLEEN: Status: RESOLVED | Noted: 2017-03-13 | Resolved: 2025-06-29

## 2025-06-29 PROBLEM — Z98.890 STATUS POST COLOSTOMY TAKEDOWN: Status: RESOLVED | Noted: 2017-11-10 | Resolved: 2025-06-29

## 2025-06-29 PROBLEM — S27.809A DIAPHRAGM INJURY: Status: RESOLVED | Noted: 2017-03-13 | Resolved: 2025-06-29

## 2025-06-29 PROBLEM — Z87.828 HISTORY OF MULTIPLE TRAUMA: Status: RESOLVED | Noted: 2017-12-21 | Resolved: 2025-06-29
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
## 2025-06-30 ENCOUNTER — OFFICE VISIT (OUTPATIENT)
Dept: FAMILY MEDICINE CLINIC | Age: 55
End: 2025-06-30

## 2025-06-30 VITALS
BODY MASS INDEX: 30.7 KG/M2 | SYSTOLIC BLOOD PRESSURE: 122 MMHG | OXYGEN SATURATION: 96 % | HEIGHT: 64 IN | HEART RATE: 76 BPM | WEIGHT: 179.8 LBS | DIASTOLIC BLOOD PRESSURE: 84 MMHG | TEMPERATURE: 99.3 F

## 2025-06-30 DIAGNOSIS — E03.9 ACQUIRED HYPOTHYROIDISM: ICD-10-CM

## 2025-06-30 DIAGNOSIS — E78.5 DYSLIPIDEMIA: Primary | ICD-10-CM

## 2025-06-30 DIAGNOSIS — F34.1 PERSISTENT DEPRESSIVE DISORDER: ICD-10-CM

## 2025-06-30 DIAGNOSIS — G81.91 RIGHT HEMIPARESIS (HCC): ICD-10-CM

## 2025-06-30 RX ORDER — ESCITALOPRAM OXALATE 20 MG/1
20 TABLET ORAL DAILY
Qty: 90 TABLET | Refills: 1 | Status: SHIPPED | OUTPATIENT
Start: 2025-06-30

## 2025-06-30 SDOH — ECONOMIC STABILITY: FOOD INSECURITY: WITHIN THE PAST 12 MONTHS, THE FOOD YOU BOUGHT JUST DIDN'T LAST AND YOU DIDN'T HAVE MONEY TO GET MORE.: NEVER TRUE

## 2025-06-30 SDOH — ECONOMIC STABILITY: FOOD INSECURITY: WITHIN THE PAST 12 MONTHS, YOU WORRIED THAT YOUR FOOD WOULD RUN OUT BEFORE YOU GOT MONEY TO BUY MORE.: NEVER TRUE

## 2025-06-30 ASSESSMENT — PATIENT HEALTH QUESTIONNAIRE - PHQ9
2. FEELING DOWN, DEPRESSED OR HOPELESS: NOT AT ALL
SUM OF ALL RESPONSES TO PHQ QUESTIONS 1-9: 0
5. POOR APPETITE OR OVEREATING: NOT AT ALL
SUM OF ALL RESPONSES TO PHQ QUESTIONS 1-9: 0
1. LITTLE INTEREST OR PLEASURE IN DOING THINGS: NOT AT ALL
4. FEELING TIRED OR HAVING LITTLE ENERGY: NOT AT ALL
9. THOUGHTS THAT YOU WOULD BE BETTER OFF DEAD, OR OF HURTING YOURSELF: NOT AT ALL
SUM OF ALL RESPONSES TO PHQ QUESTIONS 1-9: 0
7. TROUBLE CONCENTRATING ON THINGS, SUCH AS READING THE NEWSPAPER OR WATCHING TELEVISION: NOT AT ALL
6. FEELING BAD ABOUT YOURSELF - OR THAT YOU ARE A FAILURE OR HAVE LET YOURSELF OR YOUR FAMILY DOWN: NOT AT ALL
10. IF YOU CHECKED OFF ANY PROBLEMS, HOW DIFFICULT HAVE THESE PROBLEMS MADE IT FOR YOU TO DO YOUR WORK, TAKE CARE OF THINGS AT HOME, OR GET ALONG WITH OTHER PEOPLE: NOT DIFFICULT AT ALL
3. TROUBLE FALLING OR STAYING ASLEEP: NOT AT ALL
SUM OF ALL RESPONSES TO PHQ QUESTIONS 1-9: 0
8. MOVING OR SPEAKING SO SLOWLY THAT OTHER PEOPLE COULD HAVE NOTICED. OR THE OPPOSITE, BEING SO FIGETY OR RESTLESS THAT YOU HAVE BEEN MOVING AROUND A LOT MORE THAN USUAL: NOT AT ALL

## 2025-06-30 ASSESSMENT — ENCOUNTER SYMPTOMS
BLOOD IN STOOL: 0
VOMITING: 0
NAUSEA: 0
CONSTIPATION: 0
WHEEZING: 0
SHORTNESS OF BREATH: 0
ABDOMINAL PAIN: 0
COUGH: 0
DIARRHEA: 0

## 2025-07-01 ENCOUNTER — RESULTS FOLLOW-UP (OUTPATIENT)
Dept: FAMILY MEDICINE CLINIC | Age: 55
End: 2025-07-01

## 2025-07-01 LAB
ALBUMIN SERPL-MCNC: 4.5 G/DL (ref 3.4–5)
ALBUMIN/GLOB SERPL: 2 {RATIO} (ref 1.1–2.2)
ALP SERPL-CCNC: 112 U/L (ref 40–129)
ALT SERPL-CCNC: 26 U/L (ref 10–40)
ANION GAP SERPL CALCULATED.3IONS-SCNC: 10 MMOL/L (ref 3–16)
AST SERPL-CCNC: 28 U/L (ref 15–37)
BASOPHILS # BLD: 0.1 K/UL (ref 0–0.2)
BASOPHILS NFR BLD: 2 %
BILIRUB SERPL-MCNC: 0.4 MG/DL (ref 0–1)
BUN SERPL-MCNC: 11 MG/DL (ref 7–20)
CALCIUM SERPL-MCNC: 9.8 MG/DL (ref 8.3–10.6)
CHLORIDE SERPL-SCNC: 103 MMOL/L (ref 99–110)
CO2 SERPL-SCNC: 26 MMOL/L (ref 21–32)
CREAT SERPL-MCNC: 0.7 MG/DL (ref 0.6–1.1)
DEPRECATED RDW RBC AUTO: 12.5 % (ref 12.4–15.4)
EOSINOPHIL # BLD: 0.2 K/UL (ref 0–0.6)
EOSINOPHIL NFR BLD: 4 %
GFR SERPLBLD CREATININE-BSD FMLA CKD-EPI: >90 ML/MIN/{1.73_M2}
GLUCOSE SERPL-MCNC: 95 MG/DL (ref 70–99)
HCT VFR BLD AUTO: 39.4 % (ref 36–48)
HGB BLD-MCNC: 13.5 G/DL (ref 12–16)
LYMPHOCYTES # BLD: 1.7 K/UL (ref 1–5.1)
LYMPHOCYTES NFR BLD: 27.6 %
MCH RBC QN AUTO: 32.7 PG (ref 26–34)
MCHC RBC AUTO-ENTMCNC: 34.2 G/DL (ref 31–36)
MCV RBC AUTO: 95.7 FL (ref 80–100)
MONOCYTES # BLD: 0.5 K/UL (ref 0–1.3)
MONOCYTES NFR BLD: 8.1 %
NEUTROPHILS # BLD: 3.6 K/UL (ref 1.7–7.7)
NEUTROPHILS NFR BLD: 58.3 %
PLATELET # BLD AUTO: 255 K/UL (ref 135–450)
PMV BLD AUTO: 10.6 FL (ref 5–10.5)
POTASSIUM SERPL-SCNC: 4.7 MMOL/L (ref 3.5–5.1)
PROT SERPL-MCNC: 6.7 G/DL (ref 6.4–8.2)
RBC # BLD AUTO: 4.12 M/UL (ref 4–5.2)
SODIUM SERPL-SCNC: 139 MMOL/L (ref 136–145)
TSH SERPL DL<=0.005 MIU/L-ACNC: 2.41 UIU/ML (ref 0.27–4.2)
WBC # BLD AUTO: 6.2 K/UL (ref 4–11)